# Patient Record
Sex: FEMALE | Race: ASIAN | NOT HISPANIC OR LATINO
[De-identification: names, ages, dates, MRNs, and addresses within clinical notes are randomized per-mention and may not be internally consistent; named-entity substitution may affect disease eponyms.]

---

## 2024-05-20 PROBLEM — Z00.00 ENCOUNTER FOR PREVENTIVE HEALTH EXAMINATION: Status: ACTIVE | Noted: 2024-05-20

## 2024-05-22 ENCOUNTER — LABORATORY RESULT (OUTPATIENT)
Age: 44
End: 2024-05-22

## 2024-05-22 ENCOUNTER — APPOINTMENT (OUTPATIENT)
Dept: OBGYN | Facility: CLINIC | Age: 44
End: 2024-05-22
Payer: COMMERCIAL

## 2024-05-22 ENCOUNTER — TRANSCRIPTION ENCOUNTER (OUTPATIENT)
Age: 44
End: 2024-05-22

## 2024-05-22 VITALS
WEIGHT: 110 LBS | HEIGHT: 61 IN | SYSTOLIC BLOOD PRESSURE: 103 MMHG | BODY MASS INDEX: 20.77 KG/M2 | DIASTOLIC BLOOD PRESSURE: 68 MMHG

## 2024-05-22 DIAGNOSIS — N92.6 IRREGULAR MENSTRUATION, UNSPECIFIED: ICD-10-CM

## 2024-05-22 PROCEDURE — 99203 OFFICE O/P NEW LOW 30 MIN: CPT

## 2024-05-22 NOTE — PROCEDURE
[Transvaginal OB Sonogram] : Transvaginal OB Sonogram [Intrauterine Pregnancy] : intrauterine pregnancy [Yolk Sac] : yolk sac present [Fetal Heart] : fetal heart present [Date: ___] : Date: [unfilled] [Current GA by Sonogram: ___ (wks)] : Current GA by Sonogram: [unfilled]Uwks [___ day(s)] : [unfilled] days [Transvaginal OB Sonogram WNL] : Transvaginal OB Sonogram WNL [FreeTextEntry1] : SIUP CRL c/w 8+1, + FHT

## 2024-05-22 NOTE — HISTORY OF PRESENT ILLNESS
[FreeTextEntry1] : LMP 3/27 45yo  here for amenorrhea and + UPT. Notes some spotting. Periods usually q 25d. Had arm pain and was given topical diclofenac and took advil PM around 4 weeks ago.  Mammo/sono 10/2023 normal Unsure when last pap was  POB: 40wk CS 2017 for NRFHT at [facility] "Yung" unsure of weight, SAB x 2 (no D&C/med mgmt) in 1st trim

## 2024-05-22 NOTE — PLAN
[FreeTextEntry1] : Discussed AMA in pregnancy and recommend early NIPT. RTO 2 wk, to start asa 1-2 tabs at [facility] and [facility] to be given at [facility].

## 2024-05-24 LAB
HPV 16 E6+E7 MRNA CVX QL NAA+PROBE: NOT DETECTED
HPV18+45 E6+E7 MRNA CVX QL NAA+PROBE: NOT DETECTED

## 2024-05-27 LAB — CYTOLOGY CVX/VAG DOC THIN PREP: NORMAL

## 2024-06-04 ENCOUNTER — APPOINTMENT (OUTPATIENT)
Dept: OBGYN | Facility: CLINIC | Age: 44
End: 2024-06-04
Payer: COMMERCIAL

## 2024-06-04 VITALS — WEIGHT: 109 LBS | BODY MASS INDEX: 20.6 KG/M2 | DIASTOLIC BLOOD PRESSURE: 50 MMHG | SYSTOLIC BLOOD PRESSURE: 88 MMHG

## 2024-06-04 DIAGNOSIS — Z34.91 ENCOUNTER FOR SUPERVISION OF NORMAL PREGNANCY, UNSPECIFIED, FIRST TRIMESTER: ICD-10-CM

## 2024-06-04 LAB
BASOPHILS # BLD AUTO: 0.01 K/UL
BASOPHILS NFR BLD AUTO: 0.2 %
EOSINOPHIL # BLD AUTO: 0.08 K/UL
EOSINOPHIL NFR BLD AUTO: 1.3 %
HCT VFR BLD CALC: 36.2 %
HGB BLD-MCNC: 12.3 G/DL
IMM GRANULOCYTES NFR BLD AUTO: 0 %
LYMPHOCYTES # BLD AUTO: 0.99 K/UL
LYMPHOCYTES NFR BLD AUTO: 16.3 %
MAN DIFF?: NORMAL
MCHC RBC-ENTMCNC: 30.9 PG
MCHC RBC-ENTMCNC: 34 GM/DL
MCV RBC AUTO: 91 FL
MONOCYTES # BLD AUTO: 0.48 K/UL
MONOCYTES NFR BLD AUTO: 7.9 %
NEUTROPHILS # BLD AUTO: 4.52 K/UL
NEUTROPHILS NFR BLD AUTO: 74.3 %
PLATELET # BLD AUTO: 211 K/UL
RBC # BLD: 3.98 M/UL
RBC # FLD: 13.3 %
TSH SERPL-ACNC: 0.92 UIU/ML
WBC # FLD AUTO: 6.08 K/UL

## 2024-06-04 PROCEDURE — 0501F PRENATAL FLOW SHEET: CPT

## 2024-06-05 LAB
ABO + RH PNL BLD: NORMAL
BLD GP AB SCN SERPL QL: NORMAL
C TRACH RRNA SPEC QL NAA+PROBE: NOT DETECTED
HBV SURFACE AG SER QL: NONREACTIVE
HCV AB SER QL: NONREACTIVE
HCV S/CO RATIO: 0.07 S/CO
HGB A MFR BLD: 97.4 %
HGB A2 MFR BLD: 2.6 %
HGB FRACT BLD-IMP: NORMAL
HIV1+2 AB SPEC QL IA.RAPID: NONREACTIVE
N GONORRHOEA RRNA SPEC QL NAA+PROBE: NOT DETECTED
SOURCE AMPLIFICATION: NORMAL
T PALLIDUM AB SER QL IA: NEGATIVE

## 2024-06-06 LAB
BACTERIA UR CULT: NORMAL
MEV IGG FLD QL IA: >300 AU/ML
MEV IGG+IGM SER-IMP: POSITIVE
RUBV IGG FLD-ACNC: 6.3 INDEX
RUBV IGG SER-IMP: POSITIVE
VZV AB TITR SER: POSITIVE
VZV IGG SER IF-ACNC: 1702 INDEX

## 2024-06-09 LAB
AR GENE MUT ANL BLD/T: NORMAL
LEAD BLD-MCNC: <1 UG/DL

## 2024-06-10 LAB — CFTR MUT TESTED BLD/T: NEGATIVE

## 2024-06-11 LAB — FMR1 GENE MUT ANL BLD/T: NORMAL

## 2024-06-17 ENCOUNTER — NON-APPOINTMENT (OUTPATIENT)
Age: 44
End: 2024-06-17

## 2024-06-24 ENCOUNTER — APPOINTMENT (OUTPATIENT)
Dept: ANTEPARTUM | Facility: CLINIC | Age: 44
End: 2024-06-24
Payer: COMMERCIAL

## 2024-06-24 ENCOUNTER — ASOB RESULT (OUTPATIENT)
Age: 44
End: 2024-06-24

## 2024-06-24 PROCEDURE — 76813 OB US NUCHAL MEAS 1 GEST: CPT | Mod: 59

## 2024-06-24 PROCEDURE — 76801 OB US < 14 WKS SINGLE FETUS: CPT

## 2024-07-08 ENCOUNTER — NON-APPOINTMENT (OUTPATIENT)
Age: 44
End: 2024-07-08

## 2024-07-15 ENCOUNTER — APPOINTMENT (OUTPATIENT)
Dept: ANTEPARTUM | Facility: CLINIC | Age: 44
End: 2024-07-15
Payer: COMMERCIAL

## 2024-07-15 ENCOUNTER — NON-APPOINTMENT (OUTPATIENT)
Age: 44
End: 2024-07-15

## 2024-07-15 ENCOUNTER — ASOB RESULT (OUTPATIENT)
Age: 44
End: 2024-07-15

## 2024-07-15 ENCOUNTER — APPOINTMENT (OUTPATIENT)
Dept: OBGYN | Facility: CLINIC | Age: 44
End: 2024-07-15
Payer: COMMERCIAL

## 2024-07-15 VITALS
BODY MASS INDEX: 20.86 KG/M2 | WEIGHT: 110.5 LBS | SYSTOLIC BLOOD PRESSURE: 98 MMHG | DIASTOLIC BLOOD PRESSURE: 61 MMHG | HEIGHT: 61 IN

## 2024-07-15 DIAGNOSIS — Z34.92 ENCOUNTER FOR SUPERVISION OF NORMAL PREGNANCY, UNSPECIFIED, SECOND TRIMESTER: ICD-10-CM

## 2024-07-15 PROCEDURE — 76805 OB US >/= 14 WKS SNGL FETUS: CPT

## 2024-07-15 PROCEDURE — 0502F SUBSEQUENT PRENATAL CARE: CPT

## 2024-07-16 LAB
AF-AFP DISCLAIMER: NORMAL
AF-AFP MOM: 1.55
AFP CONCENTRATION: 56.9 NG/ML
AFP INTERPRETATION: NORMAL
AFP MOM CUT-OFF: 2.5
AFP PERCENTILE: 91.3
AFP SCREENING RESULT: NORMAL
AFTER SCREENING RISK OPEN SPINA BIFIDA: NORMAL
BEFORE SCREENING RISK OPEN SPINA BIFIDA: NORMAL
EXTREME ANALYTE ALERT: NO
GESTATIONAL  AGE: NORMAL
MATERNAL WGT: 110.8
RACE/ETHNICITY: NORMAL

## 2024-08-07 ENCOUNTER — NON-APPOINTMENT (OUTPATIENT)
Age: 44
End: 2024-08-07

## 2024-08-13 ENCOUNTER — APPOINTMENT (OUTPATIENT)
Dept: OBGYN | Facility: CLINIC | Age: 44
End: 2024-08-13
Payer: COMMERCIAL

## 2024-08-13 VITALS — SYSTOLIC BLOOD PRESSURE: 95 MMHG | BODY MASS INDEX: 21.8 KG/M2 | WEIGHT: 115.38 LBS | DIASTOLIC BLOOD PRESSURE: 61 MMHG

## 2024-08-13 DIAGNOSIS — Z34.92 ENCOUNTER FOR SUPERVISION OF NORMAL PREGNANCY, UNSPECIFIED, SECOND TRIMESTER: ICD-10-CM

## 2024-08-13 PROCEDURE — 0502F SUBSEQUENT PRENATAL CARE: CPT

## 2024-08-14 ENCOUNTER — ASOB RESULT (OUTPATIENT)
Age: 44
End: 2024-08-14

## 2024-08-14 ENCOUNTER — APPOINTMENT (OUTPATIENT)
Dept: ANTEPARTUM | Facility: CLINIC | Age: 44
End: 2024-08-14

## 2024-08-14 PROCEDURE — 76811 OB US DETAILED SNGL FETUS: CPT

## 2024-08-14 PROCEDURE — 99204 OFFICE O/P NEW MOD 45 MIN: CPT | Mod: 25

## 2024-08-14 PROCEDURE — 76817 TRANSVAGINAL US OBSTETRIC: CPT

## 2024-08-19 ENCOUNTER — NON-APPOINTMENT (OUTPATIENT)
Age: 44
End: 2024-08-19

## 2024-08-20 ENCOUNTER — ASOB RESULT (OUTPATIENT)
Age: 44
End: 2024-08-20

## 2024-08-20 ENCOUNTER — APPOINTMENT (OUTPATIENT)
Dept: MATERNAL FETAL MEDICINE | Facility: CLINIC | Age: 44
End: 2024-08-20
Payer: COMMERCIAL

## 2024-08-20 PROCEDURE — 99204 OFFICE O/P NEW MOD 45 MIN: CPT | Mod: 95

## 2024-08-28 DIAGNOSIS — O43.109 MALFORMATION OF PLACENTA, UNSPECIFIED, UNSPECIFIED TRIMESTER: ICD-10-CM

## 2024-08-29 ENCOUNTER — APPOINTMENT (OUTPATIENT)
Dept: ANTEPARTUM | Facility: CLINIC | Age: 44
End: 2024-08-29

## 2024-08-29 ENCOUNTER — NON-APPOINTMENT (OUTPATIENT)
Age: 44
End: 2024-08-29

## 2024-08-29 ENCOUNTER — ASOB RESULT (OUTPATIENT)
Age: 44
End: 2024-08-29

## 2024-08-29 PROCEDURE — 76817 TRANSVAGINAL US OBSTETRIC: CPT

## 2024-09-05 ENCOUNTER — APPOINTMENT (OUTPATIENT)
Dept: OBGYN | Facility: CLINIC | Age: 44
End: 2024-09-05
Payer: COMMERCIAL

## 2024-09-05 ENCOUNTER — NON-APPOINTMENT (OUTPATIENT)
Age: 44
End: 2024-09-05

## 2024-09-05 ENCOUNTER — MED ADMIN CHARGE (OUTPATIENT)
Age: 44
End: 2024-09-05

## 2024-09-05 VITALS
WEIGHT: 116 LBS | SYSTOLIC BLOOD PRESSURE: 100 MMHG | HEIGHT: 61 IN | BODY MASS INDEX: 21.9 KG/M2 | DIASTOLIC BLOOD PRESSURE: 71 MMHG

## 2024-09-05 DIAGNOSIS — Z34.92 ENCOUNTER FOR SUPERVISION OF NORMAL PREGNANCY, UNSPECIFIED, SECOND TRIMESTER: ICD-10-CM

## 2024-09-05 LAB
GLUCOSE 1H P 100 G GLC PO SERPL-MCNC: 104 MG/DL
HCT VFR BLD CALC: 32.1 %
HGB BLD-MCNC: 10.7 G/DL
HIV1+2 AB SPEC QL IA.RAPID: NONREACTIVE
MCHC RBC-ENTMCNC: 32.2 PG
MCHC RBC-ENTMCNC: 33.3 GM/DL
MCV RBC AUTO: 96.7 FL
PLATELET # BLD AUTO: 190 K/UL
RBC # BLD: 3.32 M/UL
RBC # FLD: 13.5 %
WBC # FLD AUTO: 7.73 K/UL

## 2024-09-05 PROCEDURE — 0502F SUBSEQUENT PRENATAL CARE: CPT

## 2024-09-06 LAB — T PALLIDUM AB SER QL IA: NEGATIVE

## 2024-09-11 ENCOUNTER — NON-APPOINTMENT (OUTPATIENT)
Age: 44
End: 2024-09-11

## 2024-09-13 ENCOUNTER — APPOINTMENT (OUTPATIENT)
Dept: ANTEPARTUM | Facility: CLINIC | Age: 44
End: 2024-09-13

## 2024-09-13 ENCOUNTER — APPOINTMENT (OUTPATIENT)
Dept: OTHER | Facility: CLINIC | Age: 44
End: 2024-09-13
Payer: COMMERCIAL

## 2024-09-13 PROCEDURE — 99203 OFFICE O/P NEW LOW 30 MIN: CPT

## 2024-09-13 NOTE — ASSESSMENT
[FreeTextEntry1] : Referral Subject:  delivery for complete previa and placenta increta  Pregnancy Hx: N/A  Medical/Surgical Hx: None reported  Family Hx: N/A  Prior OB: N/A  Counseling and consultation description:  This consult was conducted via Telehealth using real-time 2 way audio visual technology. The patient was located at home at the time of the visit. The provider, Dr. Raiza Ramon, was located at her office at the time of the visit. The patient and the provider participated in the Telehealth encounter. Verbal consent for Telehealth services was given by the patient. Dad/ was there. This is their second baby and is a boy.  The goal of the meeting was to discuss management of a  baby. Delivery is being scheduled for 33 6/7 weeks for complete previa and placenta increta. We discussed that a team of physicians will be present at the time of birth. The baby will be admitted to NICU for monitoring, observation, and management. The babys vital signs will be continuously monitored. Any respiratory and/or cardiovascular support will be provided as needed.  I discussed the milestones the baby will have to achieve since likely small, including breathing well, feeding well, stable glucose, and temp control. We discussed the importance of all nutrition with an emphasis on breast milk as an important nutritional and health promoting food source. She should pump immediately after delivery, and provide colostrum and milk as possible. Lactation consultants will advise on breastfeeding techniques.   Mom has a friend with a similar experience so had great questions. She would like donor milk to bridge her breastmilk, if possible.  We discussed the extensive capabilities of our hospital in terms of personnel and equipment. We also discussed visiting hours/policies. Parents verbalized an understanding of the topics and all questions were answered.  Assessment and plan:  Counseling/Coordination of Care: 40 minutes was spent on total encounter. Greater than 50% of the encounter time was spent face-to-face on counseling;  Please contact me if you have additional questions regarding this report or discussion.  Thank you for the opportunity to participate in this patients care.  Sincerely,   Raiza Ramon MD, FAAP Attending Neonatologist  Intensive Care Unit

## 2024-09-17 ENCOUNTER — APPOINTMENT (OUTPATIENT)
Dept: ANTEPARTUM | Facility: CLINIC | Age: 44
End: 2024-09-17
Payer: COMMERCIAL

## 2024-09-17 ENCOUNTER — ASOB RESULT (OUTPATIENT)
Age: 44
End: 2024-09-17

## 2024-09-17 PROCEDURE — 76817 TRANSVAGINAL US OBSTETRIC: CPT

## 2024-09-17 PROCEDURE — 76816 OB US FOLLOW-UP PER FETUS: CPT

## 2024-09-23 ENCOUNTER — APPOINTMENT (OUTPATIENT)
Dept: GYNECOLOGIC ONCOLOGY | Facility: CLINIC | Age: 44
End: 2024-09-23
Payer: COMMERCIAL

## 2024-09-23 VITALS
HEART RATE: 89 BPM | HEIGHT: 61 IN | DIASTOLIC BLOOD PRESSURE: 61 MMHG | WEIGHT: 120 LBS | BODY MASS INDEX: 22.66 KG/M2 | RESPIRATION RATE: 16 BRPM | OXYGEN SATURATION: 97 % | SYSTOLIC BLOOD PRESSURE: 98 MMHG

## 2024-09-23 DIAGNOSIS — O43.109 MALFORMATION OF PLACENTA, UNSPECIFIED, UNSPECIFIED TRIMESTER: ICD-10-CM

## 2024-09-23 PROCEDURE — 99203 OFFICE O/P NEW LOW 30 MIN: CPT

## 2024-09-23 RX ORDER — IRON/IRON ASP GLY/FA/MV-MIN 38 125-25-1MG
TABLET ORAL
Refills: 0 | Status: ACTIVE | COMMUNITY

## 2024-09-23 RX ORDER — ASPIRIN 325 MG/1
TABLET, FILM COATED ORAL
Refills: 0 | Status: ACTIVE | COMMUNITY

## 2024-09-23 NOTE — HISTORY OF PRESENT ILLNESS
[FreeTextEntry1] : Referred by Dr. Del Valle  Ms. Herbert is a 45 y/o  female, referred by Dr. Del Valle for discussion of hysterectomy at the time of birth.  She is currently 27W pregnant, with placenta increta.   She has seen MFM (Dr. Carlos Gonzalez)  24- US notes findings suggestive of placenta increta again seen.  No overt findings of percreta at this time  MRI scheduled for 10/11/24.  Plan is for delivery on 24  No complaints today.  No vaginal bleeding.  No pelvic cramping.

## 2024-09-23 NOTE — DISCUSSION/SUMMARY
[FreeTextEntry1] : 43 yo with placenta increta  I discussed the implications of abnormal placenta spectrum with findings of placenta increta of recent sonogram.  MRI is planned for 10/11/24.  She is currently scheduled for  hysterectomy in 2024.  I discussed the role of gyn oncology with surgical planning as well as the surgical risks.  Discussed risk of injury to bladder/ureter/blood transfusion/postoperative infection.  I also discussed plan for urology and ureteral catheters prior to surgery.  Will coordinate surgical plan with obstetric team and VIVIANE

## 2024-09-23 NOTE — OB HISTORY
[Total Preg ___] : : [unfilled] [Living ___] : [unfilled] (living) [ ___] : [unfilled]  section delivery(s) [AB Spont ___] : [unfilled] miscarriage(s) [Definite ___ (Date)] : the last menstrual period was [unfilled]

## 2024-09-23 NOTE — HISTORY OF PRESENT ILLNESS
[FreeTextEntry1] : Referred by Dr. Del Valle  Ms. Herbert is a 43 y/o  female, referred by Dr. Del Valle for discussion of hysterectomy at the time of birth.  She is currently 27W pregnant, with placenta increta.   She has seen MFM (Dr. Carlos Gonzalez)  24- US notes findings suggestive of placenta increta again seen.  No overt findings of percreta at this time  MRI scheduled for 10/11/24.  Plan is for delivery on 24  No complaints today.  No vaginal bleeding.  No pelvic cramping.

## 2024-09-23 NOTE — DISCUSSION/SUMMARY
[FreeTextEntry1] : 45 yo with placenta increta  I discussed the implications of abnormal placenta spectrum with findings of placenta increta of recent sonogram.  MRI is planned for 10/11/24.  She is currently scheduled for  hysterectomy in 2024.  I discussed the role of gyn oncology with surgical planning as well as the surgical risks.  Discussed risk of injury to bladder/ureter/blood transfusion/postoperative infection.  I also discussed plan for urology and ureteral catheters prior to surgery.  Will coordinate surgical plan with obstetric team and VIVIANE

## 2024-10-04 ENCOUNTER — NON-APPOINTMENT (OUTPATIENT)
Age: 44
End: 2024-10-04

## 2024-10-11 ENCOUNTER — APPOINTMENT (OUTPATIENT)
Dept: ANTEPARTUM | Facility: CLINIC | Age: 44
End: 2024-10-11
Payer: COMMERCIAL

## 2024-10-11 ENCOUNTER — OUTPATIENT (OUTPATIENT)
Dept: OUTPATIENT SERVICES | Facility: HOSPITAL | Age: 44
LOS: 1 days | End: 2024-10-11
Payer: COMMERCIAL

## 2024-10-11 ENCOUNTER — APPOINTMENT (OUTPATIENT)
Dept: MRI IMAGING | Facility: CLINIC | Age: 44
End: 2024-10-11

## 2024-10-11 ENCOUNTER — RESULT REVIEW (OUTPATIENT)
Age: 44
End: 2024-10-11

## 2024-10-11 ENCOUNTER — ASOB RESULT (OUTPATIENT)
Age: 44
End: 2024-10-11

## 2024-10-11 DIAGNOSIS — O43.109 MALFORMATION OF PLACENTA, UNSPECIFIED, UNSPECIFIED TRIMESTER: ICD-10-CM

## 2024-10-11 PROCEDURE — 76816 OB US FOLLOW-UP PER FETUS: CPT

## 2024-10-11 PROCEDURE — 72195 MRI PELVIS W/O DYE: CPT

## 2024-10-11 PROCEDURE — 72195 MRI PELVIS W/O DYE: CPT | Mod: 26

## 2024-10-11 PROCEDURE — 76817 TRANSVAGINAL US OBSTETRIC: CPT

## 2024-10-15 ENCOUNTER — APPOINTMENT (OUTPATIENT)
Dept: OBGYN | Facility: CLINIC | Age: 44
End: 2024-10-15
Payer: COMMERCIAL

## 2024-10-15 ENCOUNTER — NON-APPOINTMENT (OUTPATIENT)
Age: 44
End: 2024-10-15

## 2024-10-15 VITALS — DIASTOLIC BLOOD PRESSURE: 65 MMHG | WEIGHT: 124 LBS | BODY MASS INDEX: 23.43 KG/M2 | SYSTOLIC BLOOD PRESSURE: 100 MMHG

## 2024-10-15 DIAGNOSIS — Z34.93 ENCOUNTER FOR SUPERVISION OF NORMAL PREGNANCY, UNSPECIFIED, THIRD TRIMESTER: ICD-10-CM

## 2024-10-15 PROCEDURE — 0502F SUBSEQUENT PRENATAL CARE: CPT

## 2024-10-15 PROCEDURE — 90471 IMMUNIZATION ADMIN: CPT

## 2024-10-15 PROCEDURE — 90715 TDAP VACCINE 7 YRS/> IM: CPT

## 2024-10-23 ENCOUNTER — APPOINTMENT (OUTPATIENT)
Dept: ANTEPARTUM | Facility: HOSPITAL | Age: 44
End: 2024-10-23

## 2024-10-23 PROCEDURE — 99367 TEAM CONF W/O PAT BY PHYS: CPT

## 2024-10-29 ENCOUNTER — ASOB RESULT (OUTPATIENT)
Age: 44
End: 2024-10-29

## 2024-10-29 ENCOUNTER — APPOINTMENT (OUTPATIENT)
Dept: ANTEPARTUM | Facility: CLINIC | Age: 44
End: 2024-10-29
Payer: COMMERCIAL

## 2024-10-29 PROCEDURE — 76817 TRANSVAGINAL US OBSTETRIC: CPT

## 2024-10-29 PROCEDURE — 76815 OB US LIMITED FETUS(S): CPT

## 2024-11-05 ENCOUNTER — NON-APPOINTMENT (OUTPATIENT)
Age: 44
End: 2024-11-05

## 2024-11-05 ENCOUNTER — OUTPATIENT (OUTPATIENT)
Dept: OUTPATIENT SERVICES | Facility: HOSPITAL | Age: 44
LOS: 1 days | End: 2024-11-05
Payer: COMMERCIAL

## 2024-11-05 VITALS
HEIGHT: 62 IN | HEART RATE: 82 BPM | DIASTOLIC BLOOD PRESSURE: 63 MMHG | SYSTOLIC BLOOD PRESSURE: 93 MMHG | RESPIRATION RATE: 16 BRPM | OXYGEN SATURATION: 97 % | WEIGHT: 123.46 LBS | TEMPERATURE: 97 F

## 2024-11-05 DIAGNOSIS — Z98.891 HISTORY OF UTERINE SCAR FROM PREVIOUS SURGERY: ICD-10-CM

## 2024-11-05 DIAGNOSIS — Z01.818 ENCOUNTER FOR OTHER PREPROCEDURAL EXAMINATION: ICD-10-CM

## 2024-11-05 DIAGNOSIS — Z98.891 HISTORY OF UTERINE SCAR FROM PREVIOUS SURGERY: Chronic | ICD-10-CM

## 2024-11-05 DIAGNOSIS — Z87.2 PERSONAL HISTORY OF DISEASES OF THE SKIN AND SUBCUTANEOUS TISSUE: Chronic | ICD-10-CM

## 2024-11-05 DIAGNOSIS — O43.212 PLACENTA ACCRETA, SECOND TRIMESTER: ICD-10-CM

## 2024-11-05 DIAGNOSIS — O43.229: ICD-10-CM

## 2024-11-05 LAB
A1C WITH ESTIMATED AVERAGE GLUCOSE RESULT: 5.2 % — SIGNIFICANT CHANGE UP (ref 4–5.6)
ANION GAP SERPL CALC-SCNC: 13 MMOL/L — SIGNIFICANT CHANGE UP (ref 5–17)
BLD GP AB SCN SERPL QL: NEGATIVE — SIGNIFICANT CHANGE UP
BUN SERPL-MCNC: 6 MG/DL — LOW (ref 7–23)
CALCIUM SERPL-MCNC: 8.2 MG/DL — LOW (ref 8.4–10.5)
CHLORIDE SERPL-SCNC: 102 MMOL/L — SIGNIFICANT CHANGE UP (ref 96–108)
CO2 SERPL-SCNC: 21 MMOL/L — LOW (ref 22–31)
CREAT SERPL-MCNC: 0.45 MG/DL — LOW (ref 0.5–1.3)
EGFR: 122 ML/MIN/1.73M2 — SIGNIFICANT CHANGE UP
ESTIMATED AVERAGE GLUCOSE: 103 MG/DL — SIGNIFICANT CHANGE UP (ref 68–114)
GLUCOSE SERPL-MCNC: 112 MG/DL — HIGH (ref 70–99)
HCT VFR BLD CALC: 33.3 % — LOW (ref 34.5–45)
HGB BLD-MCNC: 11.3 G/DL — LOW (ref 11.5–15.5)
MCHC RBC-ENTMCNC: 33.7 PG — SIGNIFICANT CHANGE UP (ref 27–34)
MCHC RBC-ENTMCNC: 33.9 G/DL — SIGNIFICANT CHANGE UP (ref 32–36)
MCV RBC AUTO: 99.4 FL — SIGNIFICANT CHANGE UP (ref 80–100)
NRBC # BLD: 0 /100 WBCS — SIGNIFICANT CHANGE UP (ref 0–0)
PLATELET # BLD AUTO: 167 K/UL — SIGNIFICANT CHANGE UP (ref 150–400)
POTASSIUM SERPL-MCNC: 3.5 MMOL/L — SIGNIFICANT CHANGE UP (ref 3.5–5.3)
POTASSIUM SERPL-SCNC: 3.5 MMOL/L — SIGNIFICANT CHANGE UP (ref 3.5–5.3)
RBC # BLD: 3.35 M/UL — LOW (ref 3.8–5.2)
RBC # FLD: 12.9 % — SIGNIFICANT CHANGE UP (ref 10.3–14.5)
RH IG SCN BLD-IMP: POSITIVE — SIGNIFICANT CHANGE UP
SODIUM SERPL-SCNC: 136 MMOL/L — SIGNIFICANT CHANGE UP (ref 135–145)
WBC # BLD: 6.25 K/UL — SIGNIFICANT CHANGE UP (ref 3.8–10.5)
WBC # FLD AUTO: 6.25 K/UL — SIGNIFICANT CHANGE UP (ref 3.8–10.5)

## 2024-11-05 PROCEDURE — 85027 COMPLETE CBC AUTOMATED: CPT

## 2024-11-05 PROCEDURE — G0463: CPT

## 2024-11-05 PROCEDURE — 86901 BLOOD TYPING SEROLOGIC RH(D): CPT

## 2024-11-05 PROCEDURE — 86900 BLOOD TYPING SEROLOGIC ABO: CPT

## 2024-11-05 PROCEDURE — 80048 BASIC METABOLIC PNL TOTAL CA: CPT

## 2024-11-05 PROCEDURE — 86850 RBC ANTIBODY SCREEN: CPT

## 2024-11-05 PROCEDURE — 83036 HEMOGLOBIN GLYCOSYLATED A1C: CPT

## 2024-11-05 NOTE — OB PST NOTE - NSHPPHYSICALEXAM_GEN_ALL_CORE
Constitutional: well developed, well nourished,  and no acute distress  Neurological: Alert & Oriented x 3  HEENT:   Neck supple.  Respiratory: CTA B/L  Cardiovascular: (+) S1 & S2, RRR  Gastrointestinal:  (+) BS  Genitourinary: voiding freely  Extremities: No pedal edema  Skin:  normal skin color Constitutional: well developed, well nourished,  and no acute distress  Neurological: Alert & Oriented x 3  HEENT:   Neck supple.  Respiratory: CTA B/L  Cardiovascular: (+) S1 & S2, RRR  Gastrointestinal:  (+) BSx4  Genitourinary: voiding freely  Extremities: No pedal edema  Skin:  normal skin color

## 2024-11-05 NOTE — OB PST NOTE - PROBLEM SELECTOR PLAN 1
planned for repeat ,  hysterectomy on 2024 Main OR  PST labs send  preprocedure surgical scrub diet instructions discussed   followed  ERP protocol

## 2024-11-05 NOTE — OB PST NOTE - NSICDXPASTMEDICALHX_GEN_ALL_CORE_FT
PAST MEDICAL HISTORY:  2019 novel coronavirus disease (COVID-19)     Placenta accreta     Placenta increta

## 2024-11-05 NOTE — OB PST NOTE - NSANTHOSAYNRD_GEN_A_CORE
No. CHACHA screening performed.  STOP BANG Legend: 0-2 = LOW Risk; 3-4 = INTERMEDIATE Risk; 5-8 = HIGH Risk

## 2024-11-05 NOTE — OB PST NOTE - ASSESSMENT
CAPRINI SCORE    AGE RELATED RISK FACTORS                                                             [ x] Age 41-60 years                                            (1 Point)  [ ] Age: 61-74 years                                           (2 Points)                 [ ] Age= 75 years                                                (3 Points)             DISEASE RELATED RISK FACTORS                                                       [ ] Edema in the lower extremities                 (1 Point)                     [ ] Varicose veins                                               (1 Point)                                 [ ] BMI > 25 Kg/m2                                            (1 Point)                                  [ ] Serious infection (ie PNA)                            (1 Point)                     [ ] Lung disease ( COPD, Emphysema)            (1 Point)                                                                          [ ] Acute myocardial infarction                         (1 Point)                  [ ] Congestive heart failure (in the previous month)  (1 Point)         [ ] Inflammatory bowel disease                            (1 Point)                  [ ] Central venous access, PICC or Port               (2 points)       (within the last month)                                                                [ ] Stroke (in the previous month)                        (5 Points)    [ ] Previous or present malignancy                       (2 points)                                                                                                                                                         HEMATOLOGY RELATED FACTORS                                                         [ ] Prior episodes of VTE                                     (3 Points)                     [ ] Positive family history for VTE                      (3 Points)                  [ ] Prothrombin 43167 A                                     (3 Points)                     [ ] Factor V Leiden                                                (3 Points)                        [ ] Lupus anticoagulants                                      (3 Points)                                                           [ ] Anticardiolipin antibodies                              (3 Points)                                                       [ ] High homocysteine in the blood                   (3 Points)                                             [ ] Other congenital or acquired thrombophilia      (3 Points)                                                [ ] Heparin induced thrombocytopenia                  (3 Points)                                        MOBILITY RELATED FACTORS  [ ] Bed rest                                                         (1 Point)  [ ] Plaster cast                                                    (2 points)  [ ] Bed bound for more than 72 hours           (2 Points)    GENDER SPECIFIC FACTORS  [x ] Pregnancy or had a baby within the last month   (1 Point)  [ ] Post-partum < 6 weeks                                   (1 Point)  [ ] Hormonal therapy  or oral contraception   (1 Point)  [ ] History of pregnancy complications              (1 point)  [ ] Unexplained or recurrent              (1 Point)    OTHER RISK FACTORS                                           (1 Point)  [ ] BMI >40, smoking, diabetes requiring insulin, chemotherapy  blood transfusions and length of surgery over 2 hours    SURGERY RELATED RISK FACTORS  [ ]  Section within the last month     (1 Point)  [ ] Minor surgery                                                  (1 Point)  [ ] Arthroscopic surgery                                       (2 Points)  [x ] Planned major surgery lasting more            (2 Points)      than 45 minutes     [ ] Elective hip or knee joint replacement       (5 points)       surgery                                                TRAUMA RELATED RISK FACTORS  [ ] Fracture of the hip, pelvis, or leg                       (5 Points)  [ ] Spinal cord injury resulting in paralysis             (5 points)       (in the previous month)    [ ] Paralysis  (less than 1 month)                             (5 Points)  [ ] Multiple Trauma within 1 month                        (5 Points)    Total Score [   4     ]    Caprini Score 0-2: Low Risk, NO VTE prophylaxis required for most patients, encourage ambulation  Caprini Score 3-6: Moderate Risk , pharmacologic VTE prophylaxis is indicated for most patients (in the absence of contraindications)  Caprini Score Greater than or =7: High risk, pharmocologic VTE prophylaxis indicated for most patients (in the absence of contraindications)

## 2024-11-05 NOTE — OB PST NOTE - NS_GESTAGE_OBGYN_ALL_OB_FT
31w6d Glycopyrrolate Counseling:  I discussed with the patient the risks of glycopyrrolate including but not limited to skin rash, drowsiness, dry mouth, difficulty urinating, and blurred vision.

## 2024-11-05 NOTE — OB PST NOTE - HISTORY OF PRESENT ILLNESS
43 y/o  female with placenta increta planned for repeat ,  hysterectomy on 2024 Main OR w/ Dr. Del Valle       *****MFM (Dr. Carlos Gonzalez)  MRI Plevis: "Findings of Placenta Accreta spectrum include placental bulging at the   left anterior inferior margin and myometrial thinning along the anterior   placental margin along with loss of low T2 signal retroplacental line" 45 y/o  female with placenta increta/ accreta planned for repeat ,  hysterectomy on 2024 Main OR w/ Dr. Del Valle ( JR 2025)    *****MFM (Dr. Carlos Gonzalez)  ***was on ASA 81mg until 10/31/2024  MRI Plevis: "Findings of Placenta Accreta spectrum include placental bulging at the   left anterior inferior margin and myometrial thinning along the anterior   placental margin along with loss of low T2 signal retroplacental line"

## 2024-11-06 ENCOUNTER — NON-APPOINTMENT (OUTPATIENT)
Age: 44
End: 2024-11-06

## 2024-11-06 ENCOUNTER — APPOINTMENT (OUTPATIENT)
Dept: OBGYN | Facility: CLINIC | Age: 44
End: 2024-11-06
Payer: COMMERCIAL

## 2024-11-06 VITALS — DIASTOLIC BLOOD PRESSURE: 61 MMHG | WEIGHT: 126.5 LBS | SYSTOLIC BLOOD PRESSURE: 97 MMHG | BODY MASS INDEX: 23.9 KG/M2

## 2024-11-06 PROBLEM — O43.219 PLACENTA ACCRETA, UNSPECIFIED TRIMESTER: Chronic | Status: ACTIVE | Noted: 2024-11-05

## 2024-11-06 PROBLEM — U07.1 COVID-19: Chronic | Status: ACTIVE | Noted: 2024-11-05

## 2024-11-06 PROCEDURE — 0502F SUBSEQUENT PRENATAL CARE: CPT

## 2024-11-06 PROCEDURE — 90471 IMMUNIZATION ADMIN: CPT

## 2024-11-06 PROCEDURE — 90678 RSV VACC PREF BIVALENT IM: CPT

## 2024-11-11 ENCOUNTER — NON-APPOINTMENT (OUTPATIENT)
Age: 44
End: 2024-11-11

## 2024-11-12 ENCOUNTER — APPOINTMENT (OUTPATIENT)
Dept: UROLOGY | Facility: CLINIC | Age: 44
End: 2024-11-12
Payer: COMMERCIAL

## 2024-11-12 DIAGNOSIS — O43.109 MALFORMATION OF PLACENTA, UNSPECIFIED, UNSPECIFIED TRIMESTER: ICD-10-CM

## 2024-11-12 PROBLEM — O43.229: Chronic | Status: ACTIVE | Noted: 2024-11-05

## 2024-11-12 PROCEDURE — 99442: CPT

## 2024-11-13 ENCOUNTER — ASOB RESULT (OUTPATIENT)
Age: 44
End: 2024-11-13

## 2024-11-13 ENCOUNTER — APPOINTMENT (OUTPATIENT)
Dept: ANTEPARTUM | Facility: CLINIC | Age: 44
End: 2024-11-13
Payer: COMMERCIAL

## 2024-11-13 PROCEDURE — 76816 OB US FOLLOW-UP PER FETUS: CPT

## 2024-11-15 ENCOUNTER — NON-APPOINTMENT (OUTPATIENT)
Age: 44
End: 2024-11-15

## 2024-11-15 ENCOUNTER — APPOINTMENT (OUTPATIENT)
Dept: OBGYN | Facility: CLINIC | Age: 44
End: 2024-11-15
Payer: COMMERCIAL

## 2024-11-15 VITALS
HEIGHT: 61 IN | DIASTOLIC BLOOD PRESSURE: 70 MMHG | WEIGHT: 127.01 LBS | SYSTOLIC BLOOD PRESSURE: 109 MMHG | BODY MASS INDEX: 23.98 KG/M2

## 2024-11-15 PROCEDURE — 0502F SUBSEQUENT PRENATAL CARE: CPT

## 2024-11-16 ENCOUNTER — OUTPATIENT (OUTPATIENT)
Dept: OUTPATIENT SERVICES | Facility: HOSPITAL | Age: 44
LOS: 1 days | End: 2024-11-16
Payer: COMMERCIAL

## 2024-11-16 VITALS — HEART RATE: 90 BPM | SYSTOLIC BLOOD PRESSURE: 96 MMHG | DIASTOLIC BLOOD PRESSURE: 58 MMHG | TEMPERATURE: 98 F

## 2024-11-16 VITALS — OXYGEN SATURATION: 98 % | HEART RATE: 96 BPM

## 2024-11-16 DIAGNOSIS — Z87.2 PERSONAL HISTORY OF DISEASES OF THE SKIN AND SUBCUTANEOUS TISSUE: Chronic | ICD-10-CM

## 2024-11-16 DIAGNOSIS — O26.899 OTHER SPECIFIED PREGNANCY RELATED CONDITIONS, UNSPECIFIED TRIMESTER: ICD-10-CM

## 2024-11-16 DIAGNOSIS — Z98.891 HISTORY OF UTERINE SCAR FROM PREVIOUS SURGERY: Chronic | ICD-10-CM

## 2024-11-16 PROCEDURE — 59025 FETAL NON-STRESS TEST: CPT | Mod: 26

## 2024-11-16 PROCEDURE — 99222 1ST HOSP IP/OBS MODERATE 55: CPT | Mod: 25

## 2024-11-16 PROCEDURE — 96372 THER/PROPH/DIAG INJ SC/IM: CPT

## 2024-11-16 PROCEDURE — G0463: CPT

## 2024-11-16 RX ORDER — BETAMETHASONE SODIUM PHOSPHATE AND BETAMETHASONE ACETATE 3; 3 MG/ML; MG/ML
12 INJECTION, SUSPENSION INTRA-ARTICULAR; INTRALESIONAL; INTRAMUSCULAR ONCE
Refills: 0 | Status: COMPLETED | OUTPATIENT
Start: 2024-11-16 | End: 2024-11-16

## 2024-11-16 RX ADMIN — BETAMETHASONE SODIUM PHOSPHATE AND BETAMETHASONE ACETATE 12 MILLIGRAM(S): 3; 3 INJECTION, SUSPENSION INTRA-ARTICULAR; INTRALESIONAL; INTRAMUSCULAR at 09:21

## 2024-11-16 NOTE — OB PROVIDER TRIAGE NOTE - NSOBPROVIDERNOTE_OBGYN_ALL_OB_FT
A/P: 45yo  @33w3d p/f BMZ#2.   - NST reactive  - BMZ #2  - Pt to follow up in office as scheduled. Return precautions reviewed and all questions answered    D/w Dr. Ivon Lin, PGY-3

## 2024-11-16 NOTE — OB PROVIDER TRIAGE NOTE - NS_ATTENDINFORMED_OBGYN_ALL_OB
Chief Complaint   Patient presents with   • Shortness of Breath     started last night   • Cough     always have one    • Fever     lastnight 100.7   • UTI     just finished amoxicillan for 10 days       HISTORY OF PRESENT ILLNESS:   The patient is a 63 year old female who presents with a somewhat rambling history.  Patient starts talking about shortness of breath and cough which she states she has had for months.  It turns out that her primary provider is doing a workup on that and the patient has an open schedule for a plain x-ray and chest CT.  She tells me that they decided to do the chest CT next Wednesday.  Patient does not have any new or progressive symptoms in that regard.  She states she had a fever up to 100.7.  She has not had a known COVID exposure but she will need for that had COVID although she has not been around the neighbor for periods of significant time.  She has not had fatigue.  She has not had chest pain.  She has not had headache.  She does state that she started having dysuria and frequency today.  She denies abdominal pain or back pain.    PAST MEDICAL HISTORY, PAST SURGICAL HISTORY, SOCIAL HISTORY, MEDICATIONS, AND ALLERGIES:  Reviewed per electronic chart.    REVIEW OF SYSTEMS:   Constitutional:  She has had fever last night to 100.7  Eyes:  Denies change in visual acuity   Respiratory:  Denies productive cough or new or worsening shortness of breath   Cardiovascular:  Denies chest pain or edema   Gastrointestinal:  Denies abdominal pain, nausea, vomiting,  or diarrhea   Genitourinary:  Denies hematuria  Musculoskeletal:  Denies back pain   Integument:  Denies rash   Neurologic:  Denies headache,      PHYSICAL EXAM:  Vitals:   Vitals:    01/16/22 1133   BP: (!) 168/90   Pulse: 99   Resp: 18   Temp: 96.4 °F (35.8 °C)   TempSrc: Tympanic   SpO2: 96%   Weight: 107.2 kg (236 lb 5.3 oz)   Height: 5' 4\" (1.626 m)      Constitutional:  No acute distress, nontoxic appearance.  She does not seem  to be short of breath she is quite talkative  HENT: Normocephalic, atraumatic. Bilateral external ears normal. Oropharynx moist. No oral exudates. Nose normal.  Eyes:   EOMI (extraocular movements are intact). Conjunctivae normal. No discharge.  Neck:  Normal range of motion. No tenderness. No stridor.  Cardiovascular:  Normal heart rate. Normal rhythm. No murmurs. No rubs. No gallops.  Pulmonary/Chest:  Good air exchange.  Normal breath sounds. No respiratory distress. No wheezing.     Abdomen:   Soft. No tenderness. No masses.  No CVA tenderness  Skin:  Warm, dry. No erythema. No rash.  Neuro:  Patient awake, alert, mentation normal, nor neuro deficits.  Psychiatric:  Affect normal. Judgment normal. Mood normal.    Labs/Radiology:  Orders Placed This Encounter   • 2019 Novel Coronavirus (SARS-CoV-2)   • POCT Urine Dip Auto   • Urine, Bacterial Culture       ASSESSMENT:   1. Acute UTI    2. Suspected COVID-19 virus infection        PLAN:  Patient has about COVID testing and we agreed to do a PCR.  She possibly has a UTI so we will treat that and cultured heard.  Respiratory symptoms there is not a change or crescendo pattern.  It is not associated with chest pain.  Her air exchange is good her lungs are clear.  We suggested she continue the workup through primary care and if there is any change in her status she should contact primary care or present to the ED regarding her respiratory symptoms    Followup with primary if no improvement of symptoms or worsening. Patient acknowledged understanding of the instructions.   Dawn Del Valle MD

## 2024-11-16 NOTE — OB PROVIDER TRIAGE NOTE - NSMATERNALFETALCONCERNS_OBGYN_ALL_OB_FT
Maternal Alert  11/7/24 - Suspected placenta accreta spectrum. Maternal mdm held 10/23/24, see minutes for details.  Notify blood bank on admission.  Plan for 3-way puckett, 2 large bore IV lines, PRBCs available in OR, midline vertical incision, consider prophylactic transexamic acid at skin incision.  Delivery in Main OR with OB, GYN ONC, urology for pre-op cystoscopy stents.  -Crystal Pickering, RNC

## 2024-11-16 NOTE — OB RN TRIAGE NOTE - NSMATERNALFETALCONCERNS_OBGYN_ALL_OB_FT
COVID-19
Maternal Alert  11/7/24 - Suspected placenta accreta spectrum. Maternal mdm held 10/23/24, see minutes for details.  Notify blood bank on admission.  Plan for 3-way puckett, 2 large bore IV lines, PRBCs available in OR, midline vertical incision, consider prophylactic transexamic acid at skin incision.  Delivery in Main OR with OB, GYN ONC, urology for pre-op cystoscopy stents.  -Crystal Pickering, RNC

## 2024-11-16 NOTE — OB PROVIDER TRIAGE NOTE - HISTORY OF PRESENT ILLNESS
43yo  @33w3d p/f BMZ#2. Pt received BMZ#1 in office yesterday i/s/o planned c-hyst on  for suspected placenta increta. –VB, -LOF, -Ctx, +FM. Denies fever, chills, nausea, vomiting, diarrhea, headache, constipation, dizziness, syncope, chest pain, palpitations, shortness of breath, dysuria, urgency, frequency.  PNC: suspected increta  GBS: collected in office on 11/15  EFW: 2200  ObHx:  - C/S 2017 7#6  GynHx: denies  MedHx: denies  PSHx: C/S  PsychHx: denies  SocialHx: denies  AllergyHx: NKA  RxHx: PNV

## 2024-11-16 NOTE — OB PROVIDER TRIAGE NOTE - NSHPPHYSICALEXAM_GEN_ALL_CORE
T(C): 37 (11-16-24 @ 08:59), Max: 37 (11-16-24 @ 08:59)  HR: 96 (11-16-24 @ 09:06) (90 - 101)  BP: 96/58 (11-16-24 @ 08:59) (96/58 - 96/58)  RR: 16 (11-16-24 @ 08:59) (16 - 16)  SpO2: 97% (11-16-24 @ 09:06) (93% - 97%)    Gen: NAD  CV: clinically well perfused  Pulm: unlabored respirations  Abd: soft, NT, ND, gravid, no rebound or guarding  SVE: deferred  FHT: 140, mod morris, + accels, - decels  TOCO: acontractile

## 2024-11-16 NOTE — OB PROVIDER TRIAGE NOTE - ATTENDING COMMENTS
OB attg note    Agree with above. 43yo P1 at 33+3 with suspected placenta accreta scheduled for C hyst on 11/22 here for BMZ #2. Received first dose BMZ in office yesterday. No complaints. Initially with concern for increta but MRI shows no e/o increta. S/p MDM, no OB complaints. NST reactive. BMZ given, stable for dc home.    Archie SHEPHERD

## 2024-11-17 LAB
GP B STREP DNA SPEC QL NAA+PROBE: NOT DETECTED
SOURCE GBS: NORMAL

## 2024-11-18 ENCOUNTER — NON-APPOINTMENT (OUTPATIENT)
Age: 44
End: 2024-11-18

## 2024-11-18 DIAGNOSIS — O43.223: ICD-10-CM

## 2024-11-18 DIAGNOSIS — O34.219 MATERNAL CARE FOR UNSPECIFIED TYPE SCAR FROM PREVIOUS CESAREAN DELIVERY: ICD-10-CM

## 2024-11-18 DIAGNOSIS — Z86.16 PERSONAL HISTORY OF COVID-19: ICD-10-CM

## 2024-11-18 DIAGNOSIS — O09.523 SUPERVISION OF ELDERLY MULTIGRAVIDA, THIRD TRIMESTER: ICD-10-CM

## 2024-11-18 DIAGNOSIS — O09.293 SUPERVISION OF PREGNANCY WITH OTHER POOR REPRODUCTIVE OR OBSTETRIC HISTORY, THIRD TRIMESTER: ICD-10-CM

## 2024-11-18 DIAGNOSIS — Z3A.33 33 WEEKS GESTATION OF PREGNANCY: ICD-10-CM

## 2024-11-21 ENCOUNTER — APPOINTMENT (OUTPATIENT)
Dept: OBGYN | Facility: CLINIC | Age: 44
End: 2024-11-21
Payer: COMMERCIAL

## 2024-11-21 DIAGNOSIS — Z34.93 ENCOUNTER FOR SUPERVISION OF NORMAL PREGNANCY, UNSPECIFIED, THIRD TRIMESTER: ICD-10-CM

## 2024-11-21 PROCEDURE — 0502F SUBSEQUENT PRENATAL CARE: CPT

## 2024-11-22 ENCOUNTER — APPOINTMENT (OUTPATIENT)
Dept: OBGYN | Facility: HOSPITAL | Age: 44
End: 2024-11-22

## 2024-11-22 ENCOUNTER — APPOINTMENT (OUTPATIENT)
Dept: GYNECOLOGIC ONCOLOGY | Facility: HOSPITAL | Age: 44
End: 2024-11-22

## 2024-11-22 ENCOUNTER — TRANSCRIPTION ENCOUNTER (OUTPATIENT)
Age: 44
End: 2024-11-22

## 2024-11-22 ENCOUNTER — APPOINTMENT (OUTPATIENT)
Dept: UROLOGY | Facility: HOSPITAL | Age: 44
End: 2024-11-22

## 2024-11-22 ENCOUNTER — INPATIENT (INPATIENT)
Facility: HOSPITAL | Age: 44
LOS: 2 days | Discharge: HOME | DRG: 776 | End: 2024-11-25
Attending: OBSTETRICS & GYNECOLOGY | Admitting: OBSTETRICS & GYNECOLOGY
Payer: COMMERCIAL

## 2024-11-22 VITALS
RESPIRATION RATE: 16 BRPM | HEIGHT: 62 IN | SYSTOLIC BLOOD PRESSURE: 96 MMHG | HEART RATE: 87 BPM | TEMPERATURE: 98 F | OXYGEN SATURATION: 97 % | WEIGHT: 128.09 LBS | DIASTOLIC BLOOD PRESSURE: 63 MMHG

## 2024-11-22 VITALS
BODY MASS INDEX: 24.07 KG/M2 | SYSTOLIC BLOOD PRESSURE: 109 MMHG | DIASTOLIC BLOOD PRESSURE: 73 MMHG | WEIGHT: 127.38 LBS

## 2024-11-22 DIAGNOSIS — Z98.891 HISTORY OF UTERINE SCAR FROM PREVIOUS SURGERY: Chronic | ICD-10-CM

## 2024-11-22 DIAGNOSIS — Z87.2 PERSONAL HISTORY OF DISEASES OF THE SKIN AND SUBCUTANEOUS TISSUE: Chronic | ICD-10-CM

## 2024-11-22 DIAGNOSIS — Z98.891 HISTORY OF UTERINE SCAR FROM PREVIOUS SURGERY: ICD-10-CM

## 2024-11-22 DIAGNOSIS — O43.212 PLACENTA ACCRETA, SECOND TRIMESTER: ICD-10-CM

## 2024-11-22 LAB
ANION GAP SERPL CALC-SCNC: 10 MMOL/L — SIGNIFICANT CHANGE UP (ref 5–17)
APPEARANCE UR: CLEAR — SIGNIFICANT CHANGE UP
APTT BLD: 28.6 SEC — SIGNIFICANT CHANGE UP (ref 24.5–35.6)
BILIRUB UR-MCNC: NEGATIVE — SIGNIFICANT CHANGE UP
BLD GP AB SCN SERPL QL: NEGATIVE — SIGNIFICANT CHANGE UP
BUN SERPL-MCNC: 5 MG/DL — LOW (ref 7–23)
CALCIUM SERPL-MCNC: 7.1 MG/DL — LOW (ref 8.4–10.5)
CHLORIDE SERPL-SCNC: 108 MMOL/L — SIGNIFICANT CHANGE UP (ref 96–108)
CO2 SERPL-SCNC: 18 MMOL/L — LOW (ref 22–31)
COLOR SPEC: YELLOW — SIGNIFICANT CHANGE UP
CREAT SERPL-MCNC: 0.34 MG/DL — LOW (ref 0.5–1.3)
DIFF PNL FLD: NEGATIVE — SIGNIFICANT CHANGE UP
EGFR: 130 ML/MIN/1.73M2 — SIGNIFICANT CHANGE UP
GAS PNL BLDA: SIGNIFICANT CHANGE UP
GLUCOSE BLDC GLUCOMTR-MCNC: 97 MG/DL — SIGNIFICANT CHANGE UP (ref 70–99)
GLUCOSE SERPL-MCNC: 100 MG/DL — HIGH (ref 70–99)
GLUCOSE UR QL: NEGATIVE MG/DL — SIGNIFICANT CHANGE UP
HCT VFR BLD CALC: 38 % — SIGNIFICANT CHANGE UP (ref 34.5–45)
HGB BLD-MCNC: 13.3 G/DL — SIGNIFICANT CHANGE UP (ref 11.5–15.5)
INR BLD: 0.91 RATIO — SIGNIFICANT CHANGE UP (ref 0.85–1.16)
KETONES UR-MCNC: NEGATIVE MG/DL — SIGNIFICANT CHANGE UP
LEUKOCYTE ESTERASE UR-ACNC: NEGATIVE — SIGNIFICANT CHANGE UP
MCHC RBC-ENTMCNC: 32.1 PG — SIGNIFICANT CHANGE UP (ref 27–34)
MCHC RBC-ENTMCNC: 35 G/DL — SIGNIFICANT CHANGE UP (ref 32–36)
MCV RBC AUTO: 91.8 FL — SIGNIFICANT CHANGE UP (ref 80–100)
NITRITE UR-MCNC: NEGATIVE — SIGNIFICANT CHANGE UP
NRBC # BLD: 0 /100 WBCS — SIGNIFICANT CHANGE UP (ref 0–0)
PH UR: 8 — SIGNIFICANT CHANGE UP (ref 5–8)
PLATELET # BLD AUTO: 116 K/UL — LOW (ref 150–400)
POTASSIUM SERPL-MCNC: 4 MMOL/L — SIGNIFICANT CHANGE UP (ref 3.5–5.3)
POTASSIUM SERPL-SCNC: 4 MMOL/L — SIGNIFICANT CHANGE UP (ref 3.5–5.3)
PROT UR-MCNC: NEGATIVE MG/DL — SIGNIFICANT CHANGE UP
PROTHROM AB SERPL-ACNC: 10.4 SEC — SIGNIFICANT CHANGE UP (ref 9.9–13.4)
RBC # BLD: 4.14 M/UL — SIGNIFICANT CHANGE UP (ref 3.8–5.2)
RBC # FLD: 15.9 % — HIGH (ref 10.3–14.5)
RH IG SCN BLD-IMP: POSITIVE — SIGNIFICANT CHANGE UP
SODIUM SERPL-SCNC: 136 MMOL/L — SIGNIFICANT CHANGE UP (ref 135–145)
SP GR SPEC: 1.01 — SIGNIFICANT CHANGE UP (ref 1–1.03)
T PALLIDUM AB TITR SER: NEGATIVE — SIGNIFICANT CHANGE UP
UROBILINOGEN FLD QL: 0.2 MG/DL — SIGNIFICANT CHANGE UP (ref 0.2–1)
WBC # BLD: 17.84 K/UL — HIGH (ref 3.8–10.5)
WBC # FLD AUTO: 17.84 K/UL — HIGH (ref 3.8–10.5)

## 2024-11-22 PROCEDURE — 88307 TISSUE EXAM BY PATHOLOGIST: CPT | Mod: 26

## 2024-11-22 PROCEDURE — 58150 TOTAL HYSTERECTOMY: CPT

## 2024-11-22 PROCEDURE — 52005 CYSTO W/URTRL CATHJ: CPT

## 2024-11-22 PROCEDURE — 88302 TISSUE EXAM BY PATHOLOGIST: CPT | Mod: 26

## 2024-11-22 PROCEDURE — 59510 CESAREAN DELIVERY: CPT | Mod: GC

## 2024-11-22 DEVICE — IMPLANTABLE DEVICE: Type: IMPLANTABLE DEVICE | Status: FUNCTIONAL

## 2024-11-22 DEVICE — GUIDEWIRE SENSOR DUAL-FLEX NITINOL STRAIGHT .035" X 150CM: Type: IMPLANTABLE DEVICE | Status: FUNCTIONAL

## 2024-11-22 RX ORDER — CHLORHEXIDINE GLUCONATE 1.2 MG/ML
1 RINSE ORAL DAILY
Refills: 0 | Status: DISCONTINUED | OUTPATIENT
Start: 2024-11-22 | End: 2024-11-22

## 2024-11-22 RX ORDER — HYDROMORPHONE HYDROCHLORIDE 2 MG/1
1 TABLET ORAL
Refills: 0 | Status: DISCONTINUED | OUTPATIENT
Start: 2024-11-22 | End: 2024-11-22

## 2024-11-22 RX ORDER — IBUPROFEN 200 MG
600 TABLET ORAL EVERY 6 HOURS
Refills: 0 | Status: COMPLETED | OUTPATIENT
Start: 2024-11-22 | End: 2025-10-21

## 2024-11-22 RX ORDER — 0.9 % SODIUM CHLORIDE 0.9 %
1000 INTRAVENOUS SOLUTION INTRAVENOUS
Refills: 0 | Status: DISCONTINUED | OUTPATIENT
Start: 2024-11-22 | End: 2024-11-22

## 2024-11-22 RX ORDER — 0.9 % SODIUM CHLORIDE 0.9 %
1000 INTRAVENOUS SOLUTION INTRAVENOUS
Refills: 0 | Status: DISCONTINUED | OUTPATIENT
Start: 2024-11-22 | End: 2024-11-25

## 2024-11-22 RX ORDER — OXYCODONE HYDROCHLORIDE 30 MG/1
5 TABLET ORAL
Refills: 0 | Status: DISCONTINUED | OUTPATIENT
Start: 2024-11-22 | End: 2024-11-25

## 2024-11-22 RX ORDER — FAMOTIDINE 20 MG/1
20 TABLET, FILM COATED ORAL ONCE
Refills: 0 | Status: COMPLETED | OUTPATIENT
Start: 2024-11-22 | End: 2024-11-22

## 2024-11-22 RX ORDER — TRISODIUM CITRATE DIHYDRATE AND CITRIC ACID MONOHYDRATE 500; 334 MG/5ML; MG/5ML
15 SOLUTION ORAL ONCE
Refills: 0 | Status: COMPLETED | OUTPATIENT
Start: 2024-11-22 | End: 2024-11-22

## 2024-11-22 RX ORDER — DIPHENHYDRAMINE HCL 25 MG
25 CAPSULE ORAL EVERY 6 HOURS
Refills: 0 | Status: DISCONTINUED | OUTPATIENT
Start: 2024-11-22 | End: 2024-11-25

## 2024-11-22 RX ORDER — OXYCODONE HYDROCHLORIDE 30 MG/1
10 TABLET ORAL
Refills: 0 | Status: DISCONTINUED | OUTPATIENT
Start: 2024-11-22 | End: 2024-11-23

## 2024-11-22 RX ORDER — SIMETHICONE 125 MG
80 CAPSULE ORAL EVERY 4 HOURS
Refills: 0 | Status: DISCONTINUED | OUTPATIENT
Start: 2024-11-22 | End: 2024-11-25

## 2024-11-22 RX ORDER — LANOLIN 72 %
1 OINTMENT (GRAM) TOPICAL EVERY 6 HOURS
Refills: 0 | Status: DISCONTINUED | OUTPATIENT
Start: 2024-11-22 | End: 2024-11-25

## 2024-11-22 RX ORDER — DEXAMETHASONE 1.5 MG/1
4 TABLET ORAL EVERY 6 HOURS
Refills: 0 | Status: DISCONTINUED | OUTPATIENT
Start: 2024-11-22 | End: 2024-11-23

## 2024-11-22 RX ORDER — OXYCODONE HYDROCHLORIDE 30 MG/1
5 TABLET ORAL ONCE
Refills: 0 | Status: DISCONTINUED | OUTPATIENT
Start: 2024-11-22 | End: 2024-11-25

## 2024-11-22 RX ORDER — KETOROLAC TROMETHAMINE 30 MG/ML
30 INJECTION INTRAMUSCULAR; INTRAVENOUS EVERY 6 HOURS
Refills: 0 | Status: DISCONTINUED | OUTPATIENT
Start: 2024-11-22 | End: 2024-11-23

## 2024-11-22 RX ORDER — NALOXONE HCL 0.4 MG/ML
0.1 AMPUL (ML) INJECTION
Refills: 0 | Status: DISCONTINUED | OUTPATIENT
Start: 2024-11-22 | End: 2024-11-23

## 2024-11-22 RX ORDER — ONDANSETRON HYDROCHLORIDE 4 MG/1
4 TABLET, FILM COATED ORAL EVERY 6 HOURS
Refills: 0 | Status: DISCONTINUED | OUTPATIENT
Start: 2024-11-22 | End: 2024-11-23

## 2024-11-22 RX ORDER — TETANUS TOXOID, REDUCED DIPHTHERIA TOXOID AND ACELLULAR PERTUSSIS VACCINE, ADSORBED 5; 2.5; 8; 8; 2.5 [IU]/.5ML; [IU]/.5ML; UG/.5ML; UG/.5ML; UG/.5ML
0.5 SUSPENSION INTRAMUSCULAR ONCE
Refills: 0 | Status: DISCONTINUED | OUTPATIENT
Start: 2024-11-22 | End: 2024-11-25

## 2024-11-22 RX ORDER — ONDANSETRON HYDROCHLORIDE 4 MG/1
4 TABLET, FILM COATED ORAL ONCE
Refills: 0 | Status: DISCONTINUED | OUTPATIENT
Start: 2024-11-22 | End: 2024-11-22

## 2024-11-22 RX ORDER — OXYCODONE HYDROCHLORIDE 30 MG/1
5 TABLET ORAL
Refills: 0 | Status: DISCONTINUED | OUTPATIENT
Start: 2024-11-22 | End: 2024-11-23

## 2024-11-22 RX ORDER — HEPARIN SODIUM,PORCINE 1000/ML
5000 VIAL (ML) INJECTION EVERY 12 HOURS
Refills: 0 | Status: DISCONTINUED | OUTPATIENT
Start: 2024-11-22 | End: 2024-11-25

## 2024-11-22 RX ORDER — CEFAZOLIN SODIUM 10 G
2000 VIAL (EA) INJECTION ONCE
Refills: 0 | Status: COMPLETED | OUTPATIENT
Start: 2024-11-22 | End: 2024-11-22

## 2024-11-22 RX ORDER — HYDROMORPHONE HYDROCHLORIDE 2 MG/1
0.5 TABLET ORAL
Refills: 0 | Status: DISCONTINUED | OUTPATIENT
Start: 2024-11-22 | End: 2024-11-22

## 2024-11-22 RX ORDER — ACETAMINOPHEN 500MG 500 MG/1
975 TABLET, COATED ORAL
Refills: 0 | Status: DISCONTINUED | OUTPATIENT
Start: 2024-11-22 | End: 2024-11-25

## 2024-11-22 RX ADMIN — ACETAMINOPHEN 500MG 975 MILLIGRAM(S): 500 TABLET, COATED ORAL at 19:55

## 2024-11-22 RX ADMIN — HYDROMORPHONE HYDROCHLORIDE 0.5 MILLIGRAM(S): 2 TABLET ORAL at 14:00

## 2024-11-22 RX ADMIN — ACETAMINOPHEN 500MG 975 MILLIGRAM(S): 500 TABLET, COATED ORAL at 19:56

## 2024-11-22 RX ADMIN — TRISODIUM CITRATE DIHYDRATE AND CITRIC ACID MONOHYDRATE 15 MILLILITER(S): 500; 334 SOLUTION ORAL at 06:26

## 2024-11-22 RX ADMIN — HYDROMORPHONE HYDROCHLORIDE 0.5 MILLIGRAM(S): 2 TABLET ORAL at 14:15

## 2024-11-22 RX ADMIN — Medication 5000 UNIT(S): at 18:55

## 2024-11-22 RX ADMIN — Medication 125 MILLILITER(S): at 15:13

## 2024-11-22 RX ADMIN — FAMOTIDINE 20 MILLIGRAM(S): 20 TABLET, FILM COATED ORAL at 06:27

## 2024-11-22 RX ADMIN — Medication 125 MILLILITER(S): at 21:47

## 2024-11-22 RX ADMIN — Medication 125 MILLILITER(S): at 06:27

## 2024-11-22 NOTE — OB PROVIDER H&P - HISTORY OF PRESENT ILLNESS
43 y/o  female with placenta accreta planned for repeat ,  hysterectomy on 2024 Main OR w/ Dr Del Valle and Dr Piedra ( JR 2025)    *****MFM (Dr. Carlos Gonzalez)  ***was on ASA 81mg until 10/31/2024  MRI Plevis: "Findings of Placenta Accreta spectrum include placental bulging at the   left anterior inferior margin and myometrial thinning along the anterior   placental margin along with loss of low T2 signal retroplacental line"    ObHx: pLTCS 2017   GynHx: Denies hx of fibroids, ovarian cysts, abnml PAP smears, STIs  MedHx: Denies hx of HTN, DM, asthma, thyroid problems, blood clots/bleeding problems, hx of blood transfusions.  Meds: PNV  All: NKDA  PSHx: CSx1  FHx: Denies hx of blood clots/bleeding problems  Social: Denies alcohol/tobacco/drug use in pregnancy  Psych: Denies hx of anxiety/depression     – Will accept blood transfusions? Yes      Objective  – VS  T(C): 36.4 (24 @ 07:11)  HR: 87 (24 @ 07:11)  BP: 96/63 (24 @ 07:11)  RR: 16 (24 @ 07:11)  SpO2: 97% (24 @ 07:11)      – PE:   CV: RRR  Pulm: breathing comfortably on RA  Abd: gravid, nontender  Extr: moving all extremities with ease      – FHT: baseline 130, mod variability, +accels, -decels  – [place]: quiet

## 2024-11-22 NOTE — OB PROVIDER DELIVERY SUMMARY - AS DELIV COMPLICATIONS OB
placenta increta/nuchal cord/post-partum hysterectomy placenta accreta/nuchal cord/peripartum hemorrhage/post-partum hysterectomy

## 2024-11-22 NOTE — OB RN PATIENT PROFILE - PARENTS VERBALIZED UNDERSTANDING OF THE SAFE SKIN TO SKIN POSITIONING OF THE NEWBORN.
Called and requested : pt mom  Order: labs  Reason for order: test for dairy allergy  Call back number: [phone]  Requests call back in regards to order: yes  Okay to leave detailed voice message: yes   Statement Selected

## 2024-11-22 NOTE — OB PROVIDER DELIVERY SUMMARY - NSPROVIDERDELIVERYNOTE_OBGYN_ALL_OB_FT
repeat classical CS, total hysterectomy, bilateral salpingectomy for placenta acceta spectrum @ 34w2d.  Insertion of Ureteral catheters prior to case by Urology, see separate brief operative note for details.   Viable male infant, vertex presentation, delivered via standard breech extraction via classical incision.   Moderate amount of adhesions between bladder and lower uterine segment. Increased vascularity and ballooning of the left lower uterine segment at site of placenta. Grossly fallopian tubes, ovaries.  Hysterotomy closed in a single layer with 1-0 loop PDS.   Please see separate brief operative note for details of hysterectomy.     QBL:  2815  IVF: 3500  PRBC: 3u  UOP: 500    Dictation# repeat classical CS, total hysterectomy, bilateral salpingectomy for placenta acceta spectrum @ 34w2d.  Insertion of Ureteral catheters prior to case by Urology, see separate brief operative note for details.   Viable male infant, vertex presentation, delivered via standard breech extraction via classical incision.   Moderate amount of adhesions between bladder and lower uterine segment. Increased vascularity and ballooning of the left lower uterine segment at site of placenta. Grossly fallopian tubes, ovaries.  Hysterotomy closed in a single layer with 1-0 loop PDS.   Please see separate brief operative note for details of hysterectomy.     QBL:  2815  IVF: 3500  PRBC: 3u  UOP: 500    Dictation#[id number] Vertical skin incision, repeat fundal classical CS, total hysterectomy, bilateral salpingectomy for placenta acceta spectrum @ 34w2d.  Insertion of Ureteral catheters prior to case by Urology, see separate brief operative note for details.   Viable male infant, vertex presentation, delivered via standard breech extraction via classical incision.   Moderate amount of adhesions between bladder and lower uterine segment. Increased vascularity and ballooning of the left lower uterine segment at site of placenta. Grossly fallopian tubes, ovaries.  Hysterotomy closed in a single layer with 1-0 loop PDS.   Please see separate brief operative note for details of hysterectomy.     QBL:  2815  IVF: 3500  PRBC: 3u  UOP: 500    Dictation#[id number]    Agree with above.  Archie SHEPHERD

## 2024-11-22 NOTE — OB PROVIDER H&P - ATTENDING COMMENTS
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 AMA with neg genetic testing. Prior CS , anatomy with suspicion for possible placenta increta however MRI without e/o bladder invasion. S/p BM 11/15 - , s/p NICU consult, anesthesia, urology and gyn onc consults. Planned for  hysterectomy, BS, placement of ureteral stents. Dw pt need for midline vertical incision, risks of bleeding and excessive PPH requiring transfusion, damage to surrounding organs, infection. Discussed likelihood of recovery in SICU. Consents signed and in chart. Plan for TXA preop. Hct 33. MRI images reviewed. On call for OR.    Vital Signs Last 24 Hrs  T(C): 36.4 (2024 07:11), Max: 36.4 (2024 06:33)  T(F): 97.5 (2024 06:36), Max: 97.5 (2024 06:33)  HR: 87 (2024 07:11) (87 - 87)  BP: 96/63 (2024 07:11) (96/63 - 96/63)  BP(mean): --  RR: 16 (2024 07:11) (16 - 16)  SpO2: 97% (2024 07:11) (97% - 97%)    Archie SHEPHERD

## 2024-11-22 NOTE — CHART NOTE - NSCHARTNOTEFT_GEN_A_CORE
Patient seen and examined at bedside, recently post-op.   Patient is awake and alert. No acute concerns at this time.  Pain well controlled.   Denies CP, SOB, N/V, dizziness, lightheadedness.     Vital Signs Last 24 Hours  T(C): 36.6 (24 @ 13:00), Max: 36.6 (24 @ 13:00)  HR: 63 (24 @ 15:00) (59 - 87)  BP: 87/50 (24 @ 13:00) (87/50 - 96/63)  RR: 17 (24 @ 15:00) (15 - 19)  SpO2: 99% (24 @ 15:00) (97% - 100%)    I&O's Summary    2024 07:01  -  2024 16:00  --------------------------------------------------------  IN: 0 mL / OUT: 135 mL / NET: -135 mL      Physical Exam:  Gen: Comfortable, NAD  Psych: appropriate mood & affect  CV/pulm: breathing comfortably on RA  Pulm: CTAB  Abd: Soft, appropriately tender, non-distended  Incision: midline vertical incision clean, dry, intact  : minimal spotting on pad  Ext: Warm & well perfused. No edema or tenderness bilaterally    MEDICATIONS  (STANDING):  acetaminophen     Tablet .. 975 milliGRAM(s) Oral <User Schedule>  diphtheria/tetanus/pertussis (acellular) Vaccine (Adacel) 0.5 milliLiter(s) IntraMuscular once  ibuprofen  Tablet. 600 milliGRAM(s) Oral every 6 hours  ketorolac   Injectable 30 milliGRAM(s) IV Push every 6 hours  lactated ringers. 1000 milliLiter(s) (125 mL/Hr) IV Continuous <Continuous>  morphine PF Spinal 0.1 milliGRAM(s) IntraThecal. once    MEDICATIONS  (PRN):  dexAMETHasone  Injectable 4 milliGRAM(s) IV Push every 6 hours PRN Nausea  diphenhydrAMINE 25 milliGRAM(s) Oral every 6 hours PRN Pruritus  HYDROmorphone  Injectable 0.5 milliGRAM(s) IV Push every 10 minutes PRN Moderate Pain (4 - 6)  HYDROmorphone  Injectable 1 milliGRAM(s) IV Push every 10 minutes PRN Severe Pain (7 - 10)  lanolin Ointment 1 Application(s) Topical every 6 hours PRN Sore Nipples  magnesium hydroxide Suspension 30 milliLiter(s) Oral two times a day PRN Constipation  naloxone Injectable 0.1 milliGRAM(s) IV Push every 3 minutes PRN For ANY of the following changes in patient status:  A. Breaths Per Minute LESS THAN 10, B. Oxygen saturation LESS THAN 90%, C. Sedation score of 6 for Stop After: 4 Times  ondansetron Injectable 4 milliGRAM(s) IV Push every 6 hours PRN Nausea  ondansetron Injectable 4 milliGRAM(s) IV Push once PRN Nausea and/or Vomiting  oxyCODONE    IR 5 milliGRAM(s) Oral every 3 hours PRN Moderate to Severe Pain (4-10)  oxyCODONE    IR 5 milliGRAM(s) Oral once PRN Moderate to Severe Pain (4-10)  oxyCODONE    IR 5 milliGRAM(s) Oral every 3 hours PRN Mild Pain (1 - 3)  oxyCODONE    IR 10 milliGRAM(s) Oral every 3 hours PRN Moderate Pain (4 - 6)  simethicone 80 milliGRAM(s) Chew every 4 hours PRN Gas      A/P: POD#0 s/p  total hysterectomy, bilateral salpingectomy, insertion and removal of ureteral catheters. Patient doing well post-op.   Neuro: PO Motrin, tylenol, oxycodone PRN. S/p spinal/epidural with duramorph.    CV: Hemodynamically stable.  Monitor VS.  Pending 5pm labs. AM CBC.   Pulm: Saturating well on room air.  Encourage OOB and incentive spirometer use.   GI: Regular diet. Anti-emetics PRN.  : Bernard to gravity. UOP adequate. S/p bilateral ureteral catheters.   FEN: SLIV when tolerating PO.   Heme: HSQ/ DVT ppx w/ SCD's while in bed.   ID: Afebrile, continue to monitor fever curve, leukoctyosis.   Endo:  No active issues   Dispo: pending 5pm labs, dispo to postpartum unit.     D/w Dr Ivon Weiss-Pike PGY4

## 2024-11-22 NOTE — OB RN DELIVERY SUMMARY - NS_GENERALBABYACOMMENTA_OBGYN_ALL_OB_FT
maternal clinical indication for not doing STS and BF - under general anesthesia in main OR for scheduled hysterectomy following  delivery     clinical indication for not doing STS and BF - 34.2 weeks GA, CPAP for 15min --> straight to NICU

## 2024-11-22 NOTE — CHART NOTE - NSCHARTNOTEFT_GEN_A_CORE
Urinalysis reviewed, negative. Discussed with urology, will proceed to OR.    D/w Dr Ivon Weiss-Pike PGY4

## 2024-11-22 NOTE — OB RN DELIVERY SUMMARY - NS_SEPSISRSKCALC_OBGYN_ALL_OB_FT
EOS calculated successfully. EOS Risk Factor: 0.5/1000 live births ([facility] national incidence); GA=34w2d; Temp=97.5; ROM=0.017; GBS='Negative'; Antibiotics='No antibiotics or any antibiotics < 2 hrs prior to birth'

## 2024-11-22 NOTE — BRIEF OPERATIVE NOTE - NSICDXBRIEFPOSTOP_GEN_ALL_CORE_FT
POST-OP DIAGNOSIS:  Encounter for ureteral catheter placement 22-Nov-2024 10:09:19  Alva Stratton  
POST-OP DIAGNOSIS:  Placenta accreta 22-Nov-2024 15:33:51  Sushma Magallon

## 2024-11-22 NOTE — OB PROVIDER DELIVERY SUMMARY - NS_GESTAGEATBIRTHA_OBGYN_ALL_OB_FT
Called insurance back and still no response  Given two new numbers of [phone]  Still no response     Tried faxing on other fax machine and no response for either numbers given Dr Alas     I spoke to Javier who states the clarification that is needed is:    Is it that machine that she was working with?  Or   Is it that type of machine that she was working with?  Or   Is it all types of machines?     Please create letter as to specifics  Advise coordinator when complete for faxing    34w2d

## 2024-11-22 NOTE — OB PROVIDER H&P - ASSESSMENT
45 y/o  female with placenta accreta planned for repeat ,  hysterectomy on 2024 Main OR w/ Dr Del Valle and Dr Piedra ( JR 2025)    PLAN  - Admit to L&D  - Routine labs, IVF, NPO  - Reglan/Pepcid/Bicitra  - Abdominal prep, PAS, puckett to bedside drainage  - EFM: NSt reactive  - Anesthesia consult  - Urology consult   - PRBC hold  - TXA prior to delivery     D/w Dr. Ivon Weiss-Pike PGY4

## 2024-11-22 NOTE — OB NEONATOLOGY/PEDIATRICIAN DELIVERY SUMMARY - NSPEDSNEONOTESA_OBGYN_ALL_OB_FT
Requested by OB to attend this  delivery at 34.2 weeks for maternal increta in main OR, delivery attended by KULDEEP Cornejo . Mother is a 44 year old,  , blood type  B  pos.  Prenatal labs as follow: HIV neg, RPR non-reactive, rubella immune, HBsA neg, GBS neg on 11/15.  Maternal history significant for miscarriages x 2, breast cyst removal.  Received BMZ  -.  This pregnancy was complicated by  increta. ROM at delivery with clear  fluid.  Infant emerged breech, vigorous, with good tone. Nuchal x 1. Delayed cord clamping X  30 secs, then brought to warmer. Dried, suctioned and stimulated.  Intermittent apnea and started labored breathing at about 3 min of life CPAP +5 at 30% started and FiO2 adjusted by minutes of life.  Physical exam WNL grossly.   Apgars  7/8. Consents to hep B vaccine. Consents to circumcision. Infant admitted to NICU, transferred on CPAP+5 at 30% for further management of care. Mom updated.

## 2024-11-22 NOTE — BRIEF OPERATIVE NOTE - NSICDXBRIEFPROCEDURE_GEN_ALL_CORE_FT
PROCEDURES:  Cystoscopy, with ureteral catheterization 22-Nov-2024 10:08:46  Alva Stratton  
PROCEDURES:  Total hysterectomy after  section 2024 15:33:14  Sushma Magallon  Bilateral salpingectomy 2024 15:33:26  Susmha Magallon

## 2024-11-22 NOTE — BRIEF OPERATIVE NOTE - OPERATION/FINDINGS
Cystoscopic insertion of bilateral ureteral stent placement 
repeat classical CS, total hysterectomy, bilateral salpingectomy for placenta accreta spectrum @ 34w2d.  Insertion of Ureteral catheters prior to case by Urology, see separate brief operative note for details.   Viable male infant, delivered via standard breech extraction via classical incision.   Moderate amount of adhesions between bladder and lower uterine segment. Increased vascularity and ballooning of the left lower uterine segment at site of placenta. Grossly normal fallopian tubes, ovaries. Excellent hemostasis noted.   Ureteral catheters removed at the end of the case.

## 2024-11-22 NOTE — OB RN DELIVERY SUMMARY - NSSELHIDDEN_OBGYN_ALL_OB_FT
[[email or web address]_ALL_OB_FT:[email or web address]=],[NS_DeliveryAssist1_OBGYN_ALL_OB_FT:[email or web address]=]

## 2024-11-22 NOTE — PRE-ANESTHESIA EVALUATION ADULT - NSANTHPMHFT_GEN_ALL_CORE
>4 mets  denies HTN, pre-eclampsia  denies prior issues with GA or NA   denies back issues, easy bleeding, bruising

## 2024-11-22 NOTE — BRIEF OPERATIVE NOTE - NSICDXBRIEFPREOP_GEN_ALL_CORE_FT
PRE-OP DIAGNOSIS:  Encounter for ureteral catheter placement 22-Nov-2024 10:09:12  Alva Stratton  
PRE-OP DIAGNOSIS:  Placenta accreta 22-Nov-2024 15:33:47  Sushma Magallon  Intrauterine pregnancy 22-Nov-2024 15:34:17  Sushma Magallon

## 2024-11-22 NOTE — PRE-ANESTHESIA EVALUATION ADULT - NSANTHADDINFOFT_GEN_ALL_CORE
patient seen and examined  risk benefit discussion  all questions answered   risks, benefits, process for neuraxial and regional anesthesia discussed  will obtain vascular access (a line, large bore IV) on arrival to room  will place CSE after vascular access   discussed possible conversion to GA after delivery   possible need for central venous access discussed  possibility of massive transfusion, prolonged intubation and ventilatory/pressor support post op discussed patient seen and examined  risk benefit discussion  all questions answered   risks, benefits, process for neuraxial and general anesthesia discussed  will obtain vascular access (a line, large bore IV) on arrival to room  will place CSE after vascular access   discussed possible conversion to GA after delivery   possible need for central venous access discussed  possibility of massive transfusion, prolonged/post op intubation and ventilatory/pressor support post op discussed

## 2024-11-22 NOTE — OB PROVIDER H&P - NSLOWPPHRISK_OBGYN_A_OB
Peoples Pregnancy/Less than or equal to 4 previous vaginal births/No known bleeding disorder/No history of postpartum hemorrhage

## 2024-11-23 LAB
BASOPHILS # BLD AUTO: 0.03 K/UL — SIGNIFICANT CHANGE UP (ref 0–0.2)
BASOPHILS NFR BLD AUTO: 0.2 % — SIGNIFICANT CHANGE UP (ref 0–2)
EOSINOPHIL # BLD AUTO: 0.07 K/UL — SIGNIFICANT CHANGE UP (ref 0–0.5)
EOSINOPHIL NFR BLD AUTO: 0.5 % — SIGNIFICANT CHANGE UP (ref 0–6)
HCT VFR BLD CALC: 31.1 % — LOW (ref 34.5–45)
HCT VFR BLD CALC: 32.2 % — LOW (ref 34.5–45)
HGB BLD-MCNC: 10.7 G/DL — LOW (ref 11.5–15.5)
HGB BLD-MCNC: 11 G/DL — LOW (ref 11.5–15.5)
IMM GRANULOCYTES NFR BLD AUTO: 1.5 % — HIGH (ref 0–0.9)
LYMPHOCYTES # BLD AUTO: 1.32 K/UL — SIGNIFICANT CHANGE UP (ref 1–3.3)
LYMPHOCYTES # BLD AUTO: 9.7 % — LOW (ref 13–44)
MCHC RBC-ENTMCNC: 31.8 PG — SIGNIFICANT CHANGE UP (ref 27–34)
MCHC RBC-ENTMCNC: 31.8 PG — SIGNIFICANT CHANGE UP (ref 27–34)
MCHC RBC-ENTMCNC: 34.2 G/DL — SIGNIFICANT CHANGE UP (ref 32–36)
MCHC RBC-ENTMCNC: 34.4 G/DL — SIGNIFICANT CHANGE UP (ref 32–36)
MCV RBC AUTO: 92.3 FL — SIGNIFICANT CHANGE UP (ref 80–100)
MCV RBC AUTO: 93.1 FL — SIGNIFICANT CHANGE UP (ref 80–100)
MONOCYTES # BLD AUTO: 0.91 K/UL — HIGH (ref 0–0.9)
MONOCYTES NFR BLD AUTO: 6.7 % — SIGNIFICANT CHANGE UP (ref 2–14)
NEUTROPHILS # BLD AUTO: 11.07 K/UL — HIGH (ref 1.8–7.4)
NEUTROPHILS NFR BLD AUTO: 81.4 % — HIGH (ref 43–77)
NRBC # BLD: 0 /100 WBCS — SIGNIFICANT CHANGE UP (ref 0–0)
NRBC # BLD: 0 /100 WBCS — SIGNIFICANT CHANGE UP (ref 0–0)
PLATELET # BLD AUTO: 100 K/UL — LOW (ref 150–400)
PLATELET # BLD AUTO: 110 K/UL — LOW (ref 150–400)
RBC # BLD: 3.37 M/UL — LOW (ref 3.8–5.2)
RBC # BLD: 3.46 M/UL — LOW (ref 3.8–5.2)
RBC # FLD: 15.9 % — HIGH (ref 10.3–14.5)
RBC # FLD: 16.2 % — HIGH (ref 10.3–14.5)
WBC # BLD: 12.8 K/UL — HIGH (ref 3.8–10.5)
WBC # BLD: 13.6 K/UL — HIGH (ref 3.8–10.5)
WBC # FLD AUTO: 12.8 K/UL — HIGH (ref 3.8–10.5)
WBC # FLD AUTO: 13.6 K/UL — HIGH (ref 3.8–10.5)

## 2024-11-23 RX ORDER — IBUPROFEN 200 MG
600 TABLET ORAL EVERY 6 HOURS
Refills: 0 | Status: DISCONTINUED | OUTPATIENT
Start: 2024-11-23 | End: 2024-11-25

## 2024-11-23 RX ADMIN — Medication 125 MILLILITER(S): at 05:37

## 2024-11-23 RX ADMIN — Medication 5000 UNIT(S): at 05:34

## 2024-11-23 RX ADMIN — KETOROLAC TROMETHAMINE 30 MILLIGRAM(S): 30 INJECTION INTRAMUSCULAR; INTRAVENOUS at 05:34

## 2024-11-23 RX ADMIN — ACETAMINOPHEN 500MG 975 MILLIGRAM(S): 500 TABLET, COATED ORAL at 04:36

## 2024-11-23 RX ADMIN — KETOROLAC TROMETHAMINE 30 MILLIGRAM(S): 30 INJECTION INTRAMUSCULAR; INTRAVENOUS at 17:39

## 2024-11-23 RX ADMIN — KETOROLAC TROMETHAMINE 30 MILLIGRAM(S): 30 INJECTION INTRAMUSCULAR; INTRAVENOUS at 11:51

## 2024-11-23 RX ADMIN — ACETAMINOPHEN 500MG 975 MILLIGRAM(S): 500 TABLET, COATED ORAL at 16:10

## 2024-11-23 RX ADMIN — KETOROLAC TROMETHAMINE 30 MILLIGRAM(S): 30 INJECTION INTRAMUSCULAR; INTRAVENOUS at 06:14

## 2024-11-23 RX ADMIN — KETOROLAC TROMETHAMINE 30 MILLIGRAM(S): 30 INJECTION INTRAMUSCULAR; INTRAVENOUS at 18:56

## 2024-11-23 RX ADMIN — ACETAMINOPHEN 500MG 975 MILLIGRAM(S): 500 TABLET, COATED ORAL at 03:00

## 2024-11-23 RX ADMIN — Medication 600 MILLIGRAM(S): at 23:10

## 2024-11-23 RX ADMIN — KETOROLAC TROMETHAMINE 30 MILLIGRAM(S): 30 INJECTION INTRAMUSCULAR; INTRAVENOUS at 00:12

## 2024-11-23 RX ADMIN — ACETAMINOPHEN 500MG 975 MILLIGRAM(S): 500 TABLET, COATED ORAL at 21:21

## 2024-11-23 RX ADMIN — ACETAMINOPHEN 500MG 975 MILLIGRAM(S): 500 TABLET, COATED ORAL at 09:24

## 2024-11-23 RX ADMIN — KETOROLAC TROMETHAMINE 30 MILLIGRAM(S): 30 INJECTION INTRAMUSCULAR; INTRAVENOUS at 00:05

## 2024-11-23 RX ADMIN — ACETAMINOPHEN 500MG 975 MILLIGRAM(S): 500 TABLET, COATED ORAL at 22:16

## 2024-11-23 RX ADMIN — ACETAMINOPHEN 500MG 975 MILLIGRAM(S): 500 TABLET, COATED ORAL at 15:25

## 2024-11-23 RX ADMIN — Medication 5000 UNIT(S): at 17:39

## 2024-11-23 RX ADMIN — ACETAMINOPHEN 500MG 975 MILLIGRAM(S): 500 TABLET, COATED ORAL at 10:42

## 2024-11-23 RX ADMIN — Medication 80 MILLIGRAM(S): at 05:46

## 2024-11-23 RX ADMIN — KETOROLAC TROMETHAMINE 30 MILLIGRAM(S): 30 INJECTION INTRAMUSCULAR; INTRAVENOUS at 12:44

## 2024-11-23 NOTE — PROGRESS NOTE ADULT - SUBJECTIVE AND OBJECTIVE BOX
Day 1 of Anesthesia Pain Management Service    SUBJECTIVE:  Pain Scale Score:          [X] Refer to charted pain scores    THERAPY:    s/p neuraxial PF morphine    MEDICATIONS  (STANDING):  acetaminophen     Tablet .. 975 milliGRAM(s) Oral <User Schedule>  diphtheria/tetanus/pertussis (acellular) Vaccine (Adacel) 0.5 milliLiter(s) IntraMuscular once  heparin   Injectable 5000 Unit(s) SubCutaneous every 12 hours  ibuprofen  Tablet. 600 milliGRAM(s) Oral every 6 hours  ketorolac   Injectable 30 milliGRAM(s) IV Push every 6 hours  lactated ringers. 1000 milliLiter(s) (125 mL/Hr) IV Continuous <Continuous>    MEDICATIONS  (PRN):  diphenhydrAMINE 25 milliGRAM(s) Oral every 6 hours PRN Pruritus  lanolin Ointment 1 Application(s) Topical every 6 hours PRN Sore Nipples  magnesium hydroxide Suspension 30 milliLiter(s) Oral two times a day PRN Constipation  oxyCODONE    IR 5 milliGRAM(s) Oral every 3 hours PRN Moderate to Severe Pain (4-10)  oxyCODONE    IR 5 milliGRAM(s) Oral once PRN Moderate to Severe Pain (4-10)  simethicone 80 milliGRAM(s) Chew every 4 hours PRN Gas      OBJECTIVE:    Sedation:        	[X] Alert	[ ] Drowsy	[ ] Arousable      [ ] Asleep       [ ] Unresponsive    Side Effects:	[X] None	[ ] Nausea	[ ] Vomiting         [ ] Pruritus  		[ ] Weakness            [ ] Numbness	          [ ] Other:    Vital Signs Last 24 Hrs  T(C): 36.7 (23 Nov 2024 09:13), Max: 37.4 (22 Nov 2024 20:34)  T(F): 98.1 (23 Nov 2024 09:13), Max: 99.4 (22 Nov 2024 20:34)  HR: 67 (23 Nov 2024 09:13) (59 - 76)  BP: 99/64 (23 Nov 2024 09:13) (87/50 - 99/64)  BP(mean): 69 (22 Nov 2024 20:00) (64 - 73)  RR: 18 (23 Nov 2024 09:13) (15 - 19)  SpO2: 95% (23 Nov 2024 09:13) (94% - 100%)    Parameters below as of 23 Nov 2024 09:13  Patient On (Oxygen Delivery Method): room air        ASSESSMENT/ PLAN  [X] Patient transitioned to prn analgesics  [X] Pain management per primary service, pain service to sign off   [X]Documentation and Verification of current medications

## 2024-11-23 NOTE — PROGRESS NOTE ADULT - ASSESSMENT
A/P: 43yo POD #1 s/p  hysterectomy. Pt is s/p 3u PRBC intra-operatively.  Patient is stable and doing well. No events overnight.      #Postpartum recovery from  hysterectomy  - Continue regular diet.  - Increase ambulation.  - PO pain medication with Tylenol, Motrin and Oxycodone PRN for pain control.    - POD #1 CBC this morning.   - d/c puckett this AM pending labs  - DVT prophylaxis with Heparin 5000u BID    Sandra Nuñez PGY3   A/P: 45yo POD #1 s/p  hysterectomy. Pt is s/p 3u PRBC intra-operatively.  Patient is stable and doing well. No events overnight.      #Postpartum recovery from  hysterectomy  - Continue regular diet.  - Increase ambulation.  - PO pain medication with Tylenol, Motrin and Oxycodone PRN for pain control.    - POD #1 CBC this morning.   - d/c italo this AM  - DVT prophylaxis with Heparin 5000u BID    Sandra Nuñez PGY3

## 2024-11-23 NOTE — PROGRESS NOTE ADULT - SUBJECTIVE AND OBJECTIVE BOX
OB Postpartum Note:  Hysterectomy     S: 43yo now POD #1 s/p -hysterectomy for PAS. Her pain is well controlled. She is tolerating a regular diet but has not yet passed flatus. Bernard remains in place. Denies N/V. Denies CP/SOB/lightheadedness/dizziness/headache/epigastric pain/changes in vision.    O:   Vitals:  Vital Signs Last 24 Hrs  T(C): 37.2 (2024 01:13), Max: 37.4 (2024 20:34)  T(F): 99 (:13), Max: 99.4 (2024 20:34)  HR: 66 (:34) (59 - 87)  BP: 98/65 (:34) (87/50 - 98/65)  BP(mean): 69 (2024 20:00) (64 - 73)  RR: 18 (:13) (15 - 19)  SpO2: 97% (:13) (96% - 100%)    Parameters below as of 2024 01:13  Patient On (Oxygen Delivery Method): room air        MEDICATIONS  (STANDING):  acetaminophen     Tablet .. 975 milliGRAM(s) Oral <User Schedule>  diphtheria/tetanus/pertussis (acellular) Vaccine (Adacel) 0.5 milliLiter(s) IntraMuscular once  heparin   Injectable 5000 Unit(s) SubCutaneous every 12 hours  ibuprofen  Tablet. 600 milliGRAM(s) Oral every 6 hours  ketorolac   Injectable 30 milliGRAM(s) IV Push every 6 hours  lactated ringers. 1000 milliLiter(s) (125 mL/Hr) IV Continuous <Continuous>  morphine PF Spinal 0.1 milliGRAM(s) IntraThecal. once    MEDICATIONS  (PRN):  dexAMETHasone  Injectable 4 milliGRAM(s) IV Push every 6 hours PRN Nausea  diphenhydrAMINE 25 milliGRAM(s) Oral every 6 hours PRN Pruritus  lanolin Ointment 1 Application(s) Topical every 6 hours PRN Sore Nipples  magnesium hydroxide Suspension 30 milliLiter(s) Oral two times a day PRN Constipation  naloxone Injectable 0.1 milliGRAM(s) IV Push every 3 minutes PRN For ANY of the following changes in patient status:  A. Breaths Per Minute LESS THAN 10, B. Oxygen saturation LESS THAN 90%, C. Sedation score of 6 for Stop After: 4 Times  ondansetron Injectable 4 milliGRAM(s) IV Push every 6 hours PRN Nausea  oxyCODONE    IR 5 milliGRAM(s) Oral every 3 hours PRN Moderate to Severe Pain (4-10)  oxyCODONE    IR 5 milliGRAM(s) Oral once PRN Moderate to Severe Pain (4-10)  oxyCODONE    IR 5 milliGRAM(s) Oral every 3 hours PRN Mild Pain (1 - 3)  oxyCODONE    IR 10 milliGRAM(s) Oral every 3 hours PRN Moderate Pain (4 - 6)  simethicone 80 milliGRAM(s) Chew every 4 hours PRN Gas      Labs:  Blood type: B Positive  Rubella IgG: RPR: Negative                          13.3   17.84[H] >-----------< 116[L]    (  @ 17:06 )             38.0    - @ 17:06      136  |  108  |  5[L]  ----------------------------<  100[H]  4.0   |  18[L]  |  0.34[L]        Ca    7.1[L]      2024 17:06            PE:  General: NAD, patient resting comfortably in bed  Abdomen: Mildly distended, appropriately tender. Fundus firm.  Incision: Clean, dry, intact.  : appropriate amount of lochia on pad  Extremities: SCDs in place, no erythema     OB Postpartum Note:  Hysterectomy     S: 45yo now POD #1 s/p -hysterectomy for placenta accreta spectrum. Her pain is well controlled. She is tolerating a regular diet but has not yet passed flatus. Bernard remains in place. Denies N/V. Denies CP/SOB/lightheadedness/dizziness/headache/epigastric pain/changes in vision.  EBL during case: 2815, s/p 3 units PRBC    O:   Vitals:  Vital Signs Last 24 Hrs  T(C): 37.2 (:13), Max: 37.4 (2024 20:34)  T(F): 99 (:13), Max: 99.4 (2024 20:34)  HR: 66 (2024 20:34) (59 - 87)  BP: 98/65 (2024 20:34) (87/50 - 98/65)  BP(mean): 69 (2024 20:00) (64 - 73)  RR: 18 (:13) (15 - 19)  SpO2: 97% (:13) (96% - 100%)    Parameters below as of 2024 01:13  Patient On (Oxygen Delivery Method): room air        MEDICATIONS  (STANDING):  acetaminophen     Tablet .. 975 milliGRAM(s) Oral <User Schedule>  diphtheria/tetanus/pertussis (acellular) Vaccine (Adacel) 0.5 milliLiter(s) IntraMuscular once  heparin   Injectable 5000 Unit(s) SubCutaneous every 12 hours  ibuprofen  Tablet. 600 milliGRAM(s) Oral every 6 hours  ketorolac   Injectable 30 milliGRAM(s) IV Push every 6 hours  lactated ringers. 1000 milliLiter(s) (125 mL/Hr) IV Continuous <Continuous>  morphine PF Spinal 0.1 milliGRAM(s) IntraThecal. once    MEDICATIONS  (PRN):  dexAMETHasone  Injectable 4 milliGRAM(s) IV Push every 6 hours PRN Nausea  diphenhydrAMINE 25 milliGRAM(s) Oral every 6 hours PRN Pruritus  lanolin Ointment 1 Application(s) Topical every 6 hours PRN Sore Nipples  magnesium hydroxide Suspension 30 milliLiter(s) Oral two times a day PRN Constipation  naloxone Injectable 0.1 milliGRAM(s) IV Push every 3 minutes PRN For ANY of the following changes in patient status:  A. Breaths Per Minute LESS THAN 10, B. Oxygen saturation LESS THAN 90%, C. Sedation score of 6 for Stop After: 4 Times  ondansetron Injectable 4 milliGRAM(s) IV Push every 6 hours PRN Nausea  oxyCODONE    IR 5 milliGRAM(s) Oral every 3 hours PRN Moderate to Severe Pain (4-10)  oxyCODONE    IR 5 milliGRAM(s) Oral once PRN Moderate to Severe Pain (4-10)  oxyCODONE    IR 5 milliGRAM(s) Oral every 3 hours PRN Mild Pain (1 - 3)  oxyCODONE    IR 10 milliGRAM(s) Oral every 3 hours PRN Moderate Pain (4 - 6)  simethicone 80 milliGRAM(s) Chew every 4 hours PRN Gas      Labs:  Blood type: B Positive  Rubella IgG: RPR: Negative                          13.3   17.84[H] >-----------< 116[L]    (  @ 17:06 )             38.0    24 @ 17:06      136  |  108  |  5[L]  ----------------------------<  100[H]  4.0   |  18[L]  |  0.34[L]        Ca    7.1[L]      2024 17:06            PE:  General: NAD, patient resting comfortably in bed  Abdomen: Mildly distended, appropriately tender. Fundus firm.  Incision: Clean, dry, intact.  : appropriate amount of lochia on pad  Extremities: SCDs in place, no erythema

## 2024-11-23 NOTE — CHART NOTE - NSCHARTNOTEFT_GEN_A_CORE
Gyn Onc Chart Note     45yo P2 POD#1 s/p  hysterectomy for placenta accreta spectrum.   Feeling well this morning, pain well controlled. Tolerating PO. Ambulating without difficulty. Bernard catheter removed, has not yet voided. Denies chest pain, palpitations, SOB, lightheadedness.     VS, Labs reviewed   Patient well appearing, ambulating in room   Abdomen soft, nontender, incision clean/dry/intact     Appreciate care per Obstetrics team, please see AM progress note for full assessment and plan   H/H with greater than expected drop, stable on repeat, VS appropriate.   Continue reg diet   Continue pain regimen   DVT ppx: TOBIAS, SCDs, ambulation     Chayo Cabrales MD Gyn Onc Chart Note     45yo P2 POD#1 s/p  hysterectomy for placenta accreta spectrum.   Feeling well this morning, pain well controlled. Tolerating PO. Ambulating without difficulty. Bernard catheter removed, has not yet voided. Denies chest pain, palpitations, SOB, lightheadedness.     VS, Labs reviewed   Patient well appearing, ambulating in room   Abdomen soft, nontender, incision clean/dry/intact     Appreciate care per Obstetrics team, please see AM progress note for full assessment and plan   H/H with greater than expected drop, stable on repeat, VS appropriate.   Continue reg diet   Continue pain regimen   DVT ppx: TOBIAS, SCDs, ambulation     Chayo Cabrales MD    Patient seen and evaluated with Dr Cabrales in am of   Patient was present with  and her son.  Baby is doing well per patient  Patient was already up and walking after her C/Hyst on .    Progressing well after her surgery.  No further intervention needed from our prosepective  Sign off case.  Care per Ob team.

## 2024-11-24 LAB
-  AMOXICILLIN/CLAVULANIC ACID: SIGNIFICANT CHANGE UP
-  AMPICILLIN/SULBACTAM: SIGNIFICANT CHANGE UP
-  AMPICILLIN: SIGNIFICANT CHANGE UP
-  AMPICILLIN: SIGNIFICANT CHANGE UP
-  AZTREONAM: SIGNIFICANT CHANGE UP
-  CEFAZOLIN: SIGNIFICANT CHANGE UP
-  CEFEPIME: SIGNIFICANT CHANGE UP
-  CEFOXITIN: SIGNIFICANT CHANGE UP
-  CEFTRIAXONE: SIGNIFICANT CHANGE UP
-  CEFUROXIME: SIGNIFICANT CHANGE UP
-  CIPROFLOXACIN: SIGNIFICANT CHANGE UP
-  CIPROFLOXACIN: SIGNIFICANT CHANGE UP
-  ERTAPENEM: SIGNIFICANT CHANGE UP
-  GENTAMICIN: SIGNIFICANT CHANGE UP
-  IMIPENEM: SIGNIFICANT CHANGE UP
-  LEVOFLOXACIN: SIGNIFICANT CHANGE UP
-  LEVOFLOXACIN: SIGNIFICANT CHANGE UP
-  MEROPENEM: SIGNIFICANT CHANGE UP
-  NITROFURANTOIN: SIGNIFICANT CHANGE UP
-  NITROFURANTOIN: SIGNIFICANT CHANGE UP
-  PIPERACILLIN/TAZOBACTAM: SIGNIFICANT CHANGE UP
-  TETRACYCLINE: SIGNIFICANT CHANGE UP
-  TOBRAMYCIN: SIGNIFICANT CHANGE UP
-  TRIMETHOPRIM/SULFAMETHOXAZOLE: SIGNIFICANT CHANGE UP
-  VANCOMYCIN: SIGNIFICANT CHANGE UP
CULTURE RESULTS: ABNORMAL
HCT VFR BLD CALC: 33.6 % — LOW (ref 34.5–45)
HCT VFR BLD CALC: 35.1 % — SIGNIFICANT CHANGE UP (ref 34.5–45)
HGB BLD-MCNC: 11.5 G/DL — SIGNIFICANT CHANGE UP (ref 11.5–15.5)
HGB BLD-MCNC: 12 G/DL — SIGNIFICANT CHANGE UP (ref 11.5–15.5)
MCHC RBC-ENTMCNC: 31.4 PG — SIGNIFICANT CHANGE UP (ref 27–34)
MCHC RBC-ENTMCNC: 31.8 PG — SIGNIFICANT CHANGE UP (ref 27–34)
MCHC RBC-ENTMCNC: 34.2 G/DL — SIGNIFICANT CHANGE UP (ref 32–36)
MCHC RBC-ENTMCNC: 34.2 G/DL — SIGNIFICANT CHANGE UP (ref 32–36)
MCV RBC AUTO: 91.8 FL — SIGNIFICANT CHANGE UP (ref 80–100)
MCV RBC AUTO: 93.1 FL — SIGNIFICANT CHANGE UP (ref 80–100)
METHOD TYPE: SIGNIFICANT CHANGE UP
METHOD TYPE: SIGNIFICANT CHANGE UP
NRBC # BLD: 0 /100 WBCS — SIGNIFICANT CHANGE UP (ref 0–0)
NRBC # BLD: 0 /100 WBCS — SIGNIFICANT CHANGE UP (ref 0–0)
ORGANISM # SPEC MICROSCOPIC CNT: ABNORMAL
PLATELET # BLD AUTO: 139 K/UL — LOW (ref 150–400)
PLATELET # BLD AUTO: 139 K/UL — LOW (ref 150–400)
RBC # BLD: 3.66 M/UL — LOW (ref 3.8–5.2)
RBC # BLD: 3.77 M/UL — LOW (ref 3.8–5.2)
RBC # FLD: 15.3 % — HIGH (ref 10.3–14.5)
RBC # FLD: 15.3 % — HIGH (ref 10.3–14.5)
SPECIMEN SOURCE: SIGNIFICANT CHANGE UP
WBC # BLD: 10.67 K/UL — HIGH (ref 3.8–10.5)
WBC # BLD: 10.71 K/UL — HIGH (ref 3.8–10.5)
WBC # FLD AUTO: 10.67 K/UL — HIGH (ref 3.8–10.5)
WBC # FLD AUTO: 10.71 K/UL — HIGH (ref 3.8–10.5)

## 2024-11-24 RX ADMIN — Medication 5000 UNIT(S): at 05:03

## 2024-11-24 RX ADMIN — ACETAMINOPHEN 500MG 975 MILLIGRAM(S): 500 TABLET, COATED ORAL at 03:17

## 2024-11-24 RX ADMIN — ACETAMINOPHEN 500MG 975 MILLIGRAM(S): 500 TABLET, COATED ORAL at 17:09

## 2024-11-24 RX ADMIN — Medication 600 MILLIGRAM(S): at 13:34

## 2024-11-24 RX ADMIN — Medication 600 MILLIGRAM(S): at 00:10

## 2024-11-24 RX ADMIN — Medication 600 MILLIGRAM(S): at 05:03

## 2024-11-24 RX ADMIN — Medication 600 MILLIGRAM(S): at 06:03

## 2024-11-24 RX ADMIN — Medication 600 MILLIGRAM(S): at 20:13

## 2024-11-24 RX ADMIN — Medication 5000 UNIT(S): at 17:51

## 2024-11-24 RX ADMIN — Medication 600 MILLIGRAM(S): at 21:13

## 2024-11-24 RX ADMIN — Medication 600 MILLIGRAM(S): at 13:52

## 2024-11-24 RX ADMIN — ACETAMINOPHEN 500MG 975 MILLIGRAM(S): 500 TABLET, COATED ORAL at 02:17

## 2024-11-24 NOTE — PROGRESS NOTE ADULT - ASSESSMENT
A/P: 43yo POD #2 s/p  hysterectomy. Pt is s/p 3u PRBC intra-operatively.  Patient is stable and doing well. No events overnight.      #Postpartum recovery from  hysterectomy  - Continue regular diet.  - Increase ambulation.  - PO pain medication with Tylenol, Motrin and Oxycodone PRN for pain control.    - F/u AM CBC   - Voiding spontaneously   - DVT prophylaxis with Heparin 5000u BID    Ren Marx, PGY2

## 2024-11-24 NOTE — PROGRESS NOTE ADULT - SUBJECTIVE AND OBJECTIVE BOX
R2 POD#2  C-Hyst Progress Note    45yo now POD #2 s/p -hysterectomy (EBL: 2815 s/p 3upRBC) for placenta accreta spectrum. Her pain is well controlled. She is tolerating a regular diet and has passed flatus. Voiding spontaneously. Denies N/V. Denies CP/SOB/lightheadedness/dizziness/headache/epigastric pain/changes in vision.      Vital Signs Last 24 Hours  T(C): 36.9 (24 @ 00:43), Max: 37.4 (24 @ 12:29)  HR: 69 (24 @ 00:43) (62 - 74)  BP: 100/64 (24 @ 00:43) (92/63 - 100/64)  RR: 18 (24 @ 00:43) (18 - 18)  SpO2: 96% (24 @ 00:43) (94% - 96%)    I&O's Summary    2024 07:  -  2024 07:00  --------------------------------------------------------  IN: 1240 mL / OUT: 1560 mL / NET: -320 mL    2024 07:01  -  2024 04:01  --------------------------------------------------------  IN: 0 mL / OUT: 2100 mL / NET: -2100 mL        PE:  General: NAD, patient resting comfortably in bed  Abdomen: Mildly distended, appropriately tender. Fundus firm.  Incision: Clean, dry, intact.  : appropriate amount of lochia on pad  Extremities: SCDs in place, no erythema      Labs:    Blood Type: B Positive  Antibody Screen: Negative  RPR: Negative               10.7   12.80 )-----------( 100      (  @ 12:35 )             31.1                11.0   13.60 )-----------( 110      (  @ 07:09 )             32.2                13.3   17.84 )-----------( 116      (  @ 17:06 )             38.0         MEDICATIONS  (STANDING):  acetaminophen     Tablet .. 975 milliGRAM(s) Oral <User Schedule>  diphtheria/tetanus/pertussis (acellular) Vaccine (Adacel) 0.5 milliLiter(s) IntraMuscular once  heparin   Injectable 5000 Unit(s) SubCutaneous every 12 hours  ibuprofen  Tablet. 600 milliGRAM(s) Oral every 6 hours  lactated ringers. 1000 milliLiter(s) (125 mL/Hr) IV Continuous <Continuous>    MEDICATIONS  (PRN):  diphenhydrAMINE 25 milliGRAM(s) Oral every 6 hours PRN Pruritus  lanolin Ointment 1 Application(s) Topical every 6 hours PRN Sore Nipples  magnesium hydroxide Suspension 30 milliLiter(s) Oral two times a day PRN Constipation  oxyCODONE    IR 5 milliGRAM(s) Oral every 3 hours PRN Moderate to Severe Pain (4-10)  oxyCODONE    IR 5 milliGRAM(s) Oral once PRN Moderate to Severe Pain (4-10)  simethicone 80 milliGRAM(s) Chew every 4 hours PRN Gas

## 2024-11-25 ENCOUNTER — TRANSCRIPTION ENCOUNTER (OUTPATIENT)
Age: 44
End: 2024-11-25

## 2024-11-25 VITALS — SYSTOLIC BLOOD PRESSURE: 105 MMHG | DIASTOLIC BLOOD PRESSURE: 69 MMHG

## 2024-11-25 PROCEDURE — 82330 ASSAY OF CALCIUM: CPT

## 2024-11-25 PROCEDURE — 88302 TISSUE EXAM BY PATHOLOGIST: CPT

## 2024-11-25 PROCEDURE — 88307 TISSUE EXAM BY PATHOLOGIST: CPT

## 2024-11-25 PROCEDURE — 59050 FETAL MONITOR W/REPORT: CPT

## 2024-11-25 PROCEDURE — 81003 URINALYSIS AUTO W/O SCOPE: CPT

## 2024-11-25 PROCEDURE — 87077 CULTURE AEROBIC IDENTIFY: CPT

## 2024-11-25 PROCEDURE — 36415 COLL VENOUS BLD VENIPUNCTURE: CPT

## 2024-11-25 PROCEDURE — 82803 BLOOD GASES ANY COMBINATION: CPT

## 2024-11-25 PROCEDURE — C9399: CPT

## 2024-11-25 PROCEDURE — 85610 PROTHROMBIN TIME: CPT

## 2024-11-25 PROCEDURE — C1769: CPT

## 2024-11-25 PROCEDURE — 86923 COMPATIBILITY TEST ELECTRIC: CPT

## 2024-11-25 PROCEDURE — 86900 BLOOD TYPING SEROLOGIC ABO: CPT

## 2024-11-25 PROCEDURE — 80048 BASIC METABOLIC PNL TOTAL CA: CPT

## 2024-11-25 PROCEDURE — 85014 HEMATOCRIT: CPT

## 2024-11-25 PROCEDURE — 82962 GLUCOSE BLOOD TEST: CPT

## 2024-11-25 PROCEDURE — 82435 ASSAY OF BLOOD CHLORIDE: CPT

## 2024-11-25 PROCEDURE — 84295 ASSAY OF SERUM SODIUM: CPT

## 2024-11-25 PROCEDURE — 85027 COMPLETE CBC AUTOMATED: CPT

## 2024-11-25 PROCEDURE — P9016: CPT

## 2024-11-25 PROCEDURE — 84132 ASSAY OF SERUM POTASSIUM: CPT

## 2024-11-25 PROCEDURE — 86901 BLOOD TYPING SEROLOGIC RH(D): CPT

## 2024-11-25 PROCEDURE — 86780 TREPONEMA PALLIDUM: CPT

## 2024-11-25 PROCEDURE — C1729: CPT

## 2024-11-25 PROCEDURE — 59025 FETAL NON-STRESS TEST: CPT

## 2024-11-25 PROCEDURE — 85025 COMPLETE CBC W/AUTO DIFF WBC: CPT

## 2024-11-25 PROCEDURE — 83605 ASSAY OF LACTIC ACID: CPT

## 2024-11-25 PROCEDURE — 85018 HEMOGLOBIN: CPT

## 2024-11-25 PROCEDURE — 87086 URINE CULTURE/COLONY COUNT: CPT

## 2024-11-25 PROCEDURE — 82947 ASSAY GLUCOSE BLOOD QUANT: CPT

## 2024-11-25 PROCEDURE — 87186 SC STD MICRODIL/AGAR DIL: CPT

## 2024-11-25 PROCEDURE — 86850 RBC ANTIBODY SCREEN: CPT

## 2024-11-25 PROCEDURE — 85730 THROMBOPLASTIN TIME PARTIAL: CPT

## 2024-11-25 RX ORDER — PRENATAL VIT/IRON FUM/FOLIC AC 60 MG-1 MG
0 TABLET ORAL
Refills: 0 | DISCHARGE

## 2024-11-25 RX ORDER — IBUPROFEN 200 MG
3 TABLET ORAL
Qty: 0 | Refills: 0 | DISCHARGE
Start: 2024-11-25

## 2024-11-25 RX ORDER — OXYCODONE HYDROCHLORIDE 30 MG/1
1 TABLET ORAL
Qty: 16 | Refills: 0
Start: 2024-11-25

## 2024-11-25 RX ORDER — ACETAMINOPHEN 500MG 500 MG/1
2 TABLET, COATED ORAL
Qty: 0 | Refills: 0 | DISCHARGE
Start: 2024-11-25

## 2024-11-25 RX ADMIN — Medication 5000 UNIT(S): at 05:09

## 2024-11-25 RX ADMIN — ACETAMINOPHEN 500MG 975 MILLIGRAM(S): 500 TABLET, COATED ORAL at 00:29

## 2024-11-25 RX ADMIN — ACETAMINOPHEN 500MG 975 MILLIGRAM(S): 500 TABLET, COATED ORAL at 06:00

## 2024-11-25 RX ADMIN — ACETAMINOPHEN 500MG 975 MILLIGRAM(S): 500 TABLET, COATED ORAL at 12:05

## 2024-11-25 RX ADMIN — ACETAMINOPHEN 500MG 975 MILLIGRAM(S): 500 TABLET, COATED ORAL at 01:29

## 2024-11-25 RX ADMIN — ACETAMINOPHEN 500MG 975 MILLIGRAM(S): 500 TABLET, COATED ORAL at 05:08

## 2024-11-25 RX ADMIN — ACETAMINOPHEN 500MG 975 MILLIGRAM(S): 500 TABLET, COATED ORAL at 12:42

## 2024-11-25 NOTE — PROGRESS NOTE ADULT - SUBJECTIVE AND OBJECTIVE BOX
OB Postpartum Note:  Delivery    S: 43yo now POD #3 s/p -hysterectomy (EBL: 2815 s/p 3u pRBC) for placenta accreta spectrum. Her pain is well controlled. She is tolerating a regular diet and passing flatus. Voiding spontaneously and ambulating without difficulty. Denies Nausea/vomiting/CP/SOB/lightheadedness/dizziness/headache/epigastric pain/changes in vision.    O:   Vitals:  Vital Signs Last 24 Hrs  T(C): 36.8 (2024 00:24), Max: 37.2 (2024 08:58)  T(F): 98.2 (2024 00:24), Max: 99 (2024 08:58)  HR: 68 (2024 00:24) (68 - 83)  BP: 101/67 (2024 00:24) (97/61 - 104/67)  BP(mean): --  RR: 18 (2024 00:24) (18 - 18)  SpO2: 97% (2024 00:24) (95% - 99%)    Parameters below as of 2024 00:24  Patient On (Oxygen Delivery Method): room air        MEDICATIONS  (STANDING):  acetaminophen     Tablet .. 975 milliGRAM(s) Oral <User Schedule>  diphtheria/tetanus/pertussis (acellular) Vaccine (Adacel) 0.5 milliLiter(s) IntraMuscular once  heparin   Injectable 5000 Unit(s) SubCutaneous every 12 hours  ibuprofen  Tablet. 600 milliGRAM(s) Oral every 6 hours  lactated ringers. 1000 milliLiter(s) (125 mL/Hr) IV Continuous <Continuous>    MEDICATIONS  (PRN):  diphenhydrAMINE 25 milliGRAM(s) Oral every 6 hours PRN Pruritus  lanolin Ointment 1 Application(s) Topical every 6 hours PRN Sore Nipples  magnesium hydroxide Suspension 30 milliLiter(s) Oral two times a day PRN Constipation  oxyCODONE    IR 5 milliGRAM(s) Oral every 3 hours PRN Moderate to Severe Pain (4-10)  oxyCODONE    IR 5 milliGRAM(s) Oral once PRN Moderate to Severe Pain (4-10)  simethicone 80 milliGRAM(s) Chew every 4 hours PRN Gas      Labs:  Blood type: B Positive  Rubella IgG: RPR: Negative                          11.5   10.67[H] >-----------< 139[L]    (  @ 11:05 )             33.6[L]                        12.0   10.71[H] >-----------< 139[L]    (  @ 06:25 )             35.1                        10.7[L]   12.80[H] >-----------< 100[L]    (  @ 12:35 )             31.1[L]                        11.0[L]   13.60[H] >-----------< 110[L]    (  @ 07:09 )             32.2[L]                        13.3   17.84[H] >-----------< 116[L]    (  @ 17:06 )             38.0    24 @ 17:06      136  |  108  |  5[L]  ----------------------------<  100[H]  4.0   |  18[L]  |  0.34[L]        Ca    7.1[L]      2024 17:06            PE:  General: NAD, patient resting comfortably in bed  Abdomen: Mildly distended, appropriately tender. Fundus firm.  Incision: Clean, dry, intact.  : appropriate amount of lochia on pad  Extremities: SCDs in place, no erythema OB Postpartum Note:  Delivery    S: 45yo now POD #3 s/p -hysterectomy (EBL: 2815 s/p 3u pRBC) for placenta accreta spectrum. Her pain is well controlled. She is tolerating a regular diet and passing flatus. Voiding spontaneously and ambulating without difficulty. Denies Nausea/vomiting/CP/SOB/lightheadedness/dizziness/headache/epigastric pain/changes in vision.    O:   Vitals:  Vital Signs Last 24 Hrs  T(C): 36.8 (2024 00:24), Max: 37.2 (2024 08:58)  T(F): 98.2 (2024 00:24), Max: 99 (2024 08:58)  HR: 68 (2024 00:24) (68 - 83)  BP: 101/67 (2024 00:24) (97/61 - 104/67)  BP(mean): --  RR: 18 (2024 00:24) (18 - 18)  SpO2: 97% (2024 00:24) (95% - 99%)    Parameters below as of 2024 00:24  Patient On (Oxygen Delivery Method): room air        MEDICATIONS  (STANDING):  acetaminophen     Tablet .. 975 milliGRAM(s) Oral <User Schedule>  diphtheria/tetanus/pertussis (acellular) Vaccine (Adacel) 0.5 milliLiter(s) IntraMuscular once  heparin   Injectable 5000 Unit(s) SubCutaneous every 12 hours  ibuprofen  Tablet. 600 milliGRAM(s) Oral every 6 hours  lactated ringers. 1000 milliLiter(s) (125 mL/Hr) IV Continuous <Continuous>    MEDICATIONS  (PRN):  diphenhydrAMINE 25 milliGRAM(s) Oral every 6 hours PRN Pruritus  lanolin Ointment 1 Application(s) Topical every 6 hours PRN Sore Nipples  magnesium hydroxide Suspension 30 milliLiter(s) Oral two times a day PRN Constipation  oxyCODONE    IR 5 milliGRAM(s) Oral every 3 hours PRN Moderate to Severe Pain (4-10)  oxyCODONE    IR 5 milliGRAM(s) Oral once PRN Moderate to Severe Pain (4-10)  simethicone 80 milliGRAM(s) Chew every 4 hours PRN Gas      Labs:  Blood type: B Positive  Rubella IgG: RPR: Negative                          11.5   10.67[H] >-----------< 139[L]    (  @ 11:05 )             33.6[L]                        12.0   10.71[H] >-----------< 139[L]    (  @ 06:25 )             35.1                        10.7[L]   12.80[H] >-----------< 100[L]    (  @ 12:35 )             31.1[L]                        11.0[L]   13.60[H] >-----------< 110[L]    (  @ 07:09 )             32.2[L]                        13.3   17.84[H] >-----------< 116[L]    (  @ 17:06 )             38.0    24 @ 17:06      136  |  108  |  5[L]  ----------------------------<  100[H]  4.0   |  18[L]  |  0.34[L]        Ca    7.1[L]      2024 17:06            PE:  General: NAD, patient resting comfortably in bed  Abdomen: Mildly distended, appropriately tender. Fundus surgically absent.  Incision: Clean, dry, intact.  : appropriate amount of lochia on pad  Extremities: SCDs in place, no erythema

## 2024-11-25 NOTE — DISCHARGE NOTE OB - PATIENT PORTAL LINK FT
You can access the FollowMyHealth Patient Portal offered by [facility] by registering at the following website: [email or web address]. By joining [employer]’s FollowMyHealth portal, you will also be able to view your health information using other applications (apps) compatible with our system.

## 2024-11-25 NOTE — DISCHARGE NOTE OB - MEDICATION SUMMARY - MEDICATIONS TO TAKE
I will START or STAY ON the medications listed below when I get home from the hospital:    ibuprofen 200 mg oral tablet  -- 3 tab(s) by mouth every 6 hours  -- Indication: For Placenta accreta    Tylenol Extra Strength 500 mg oral tablet  -- 2 tab(s) by mouth every 6 hours  -- Indication: For Placenta accreta    oxyCODONE 5 mg oral tablet  -- 1 tab(s) by mouth every 6 hours MDD: 4 tabs  -- Indication: For Placenta accreta

## 2024-11-25 NOTE — DISCHARGE NOTE OB - PLAN OF CARE
Pt had  hysterectomy for placenta accreta. Received 3u PRBCs intraop for acute blood loss anemia with stable CBC postop. Had uncomplicated postpartum course and stable for dc home

## 2024-11-25 NOTE — PROGRESS NOTE ADULT - ASSESSMENT
A/P: 43yo POD #2 s/p  hysterectomy. Pt is s/p 3u PRBC intra-operatively. Patient is stable and doing well. No events overnight.      #Postpartum recovery from  hysterectomy  - Continue regular diet.  - Increase ambulation.  - PO pain medication with Tylenol, Motrin and Oxycodone PRN for pain control.    - Voiding spontaneously   - DVT prophylaxis with Heparin 5000u BID    Sandra Nuñez PGY3

## 2024-11-25 NOTE — DISCHARGE NOTE OB - CARE PLAN
1 Principal Discharge DX:	Delivery by  hysterectomy  Assessment and plan of treatment:	Pt had  hysterectomy for placenta accreta. Received 3u PRBCs intraop for acute blood loss anemia with stable CBC postop. Had uncomplicated postpartum course and stable for dc home

## 2024-11-25 NOTE — DISCHARGE NOTE OB - CARE PROVIDER_API CALL
Dawn Del Valle  Obstetrics and Gynecology  [address], [address]  [place] [address]  Phone: [phone]  Fax: [phone]  Follow Up Time:

## 2024-11-25 NOTE — CHART NOTE - NSCHARTNOTEFT_GEN_A_CORE
Urine culture sent preoperatively per Urology request.  Culture reviewed, +10-49K E. coli and E. faecalis.  S/p bilateral ureteral catheters removed on POD#0 and puckett catheter removed on POD#1.     Discussed with Urology resident, no indication for treatment if pt asymptomatic and clinically stable.     D/w Dr Ivon Weiss-Pike PGY4

## 2024-11-25 NOTE — PROGRESS NOTE ADULT - ATTENDING COMMENTS
OB attg note    45yo  s/p  hysterectomy/BS at 34+2 now POD3 for placenta accreta. Had ureteral caths placed preop and removed POD0, QBL 2815 s/p 3u PRBCs intraop. Pt has remained stable postop and recovering appropriately. Pain controlled, tolerating PO, no n/v, ambulating, passing flatus. Final ucx with 10-50K Ecoli and Efaecalis, no sx of UTI and per urology no additional abx required. Pt desires dc home today. VSS, midline vertical incision c/d/i with prineo. Abd soft, NTND. Baby on CPAP in the NICU. Pt pumping. Discussed stable for dc home, discussed pain control regimen at home and RTO 2 wk for incision check.     Vital Signs Last 24 Hrs  T(C): 36.4 (2024 09:18), Max: 37 (2024 12:36)  T(F): 97.5 (2024 09:18), Max: 98.6 (2024 12:36)  HR: 85 (2024 09:18) (68 - 85)  BP: 105/69 (2024 10:17) (98/66 - 105/69)  BP(mean): --  RR: 18 (2024 09:18) (18 - 18)  SpO2: 98% (2024 09:18) (95% - 98%)    Parameters below as of 2024 09:18  Patient On (Oxygen Delivery Method): room air                          11.5   10.67 )-----------( 139      ( 2024 11:05 )             33.6     Archie SHEPHERD
Pt seen and examined  Agree with above assessment and plan  AM CBC h/h: 12.0/35.1, platelets 139, appears possibly hemoconcentrated/incorrect draw so will repeat  Otherwise recovering appropriately and meeting milestones      Penelope Stern MD
Pt seen and examined  increase out of bed  regular diet as tolerated  r/p CBC this afternoon to confirm stability  all questions/concerns of pt and her partner addressed to their satisfaction    Alva Alford MD

## 2024-11-25 NOTE — DISCHARGE NOTE OB - HOSPITAL COURSE
Patient had uncomplicated  hysterectomy with QBL 2815 s/p 3u PRBCs intraop. Had ureteral stents placed preop and removed at end of case..  Please see delivery note for details.  During postpartum course patient's vitals were stable, vaginal bleeding appropriate, and pain well controlled.  On day of discharge patient was ambulating, her pain controlled with oral medications, had adequate oral intake, and was voiding freely.  Discharge instructions and precautions were given.  Will return to clinic in 2-3 week for incision check and 6 weeks for postpartum visit.

## 2024-11-29 LAB — SURGICAL PATHOLOGY STUDY: SIGNIFICANT CHANGE UP

## 2024-12-13 ENCOUNTER — APPOINTMENT (OUTPATIENT)
Dept: OBGYN | Facility: CLINIC | Age: 44
End: 2024-12-13
Payer: COMMERCIAL

## 2024-12-13 ENCOUNTER — NON-APPOINTMENT (OUTPATIENT)
Age: 44
End: 2024-12-13

## 2024-12-13 VITALS — SYSTOLIC BLOOD PRESSURE: 99 MMHG | WEIGHT: 110 LBS | DIASTOLIC BLOOD PRESSURE: 65 MMHG | BODY MASS INDEX: 20.78 KG/M2

## 2024-12-13 PROCEDURE — 0503F POSTPARTUM CARE VISIT: CPT

## 2024-12-13 NOTE — OB RN TRIAGE NOTE - NSDCTEMPSITE_OBGYN_A_OB
Reason for Disposition  • Second attempt to contact family AND no contact made. Phone number verified.    Protocols used: No Contact or Duplicate Contact Call-Pediatric-OH     oral
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
## 2025-01-02 ENCOUNTER — NON-APPOINTMENT (OUTPATIENT)
Age: 45
End: 2025-01-02

## 2025-01-02 ENCOUNTER — APPOINTMENT (OUTPATIENT)
Dept: OBGYN | Facility: CLINIC | Age: 45
End: 2025-01-02
Payer: COMMERCIAL

## 2025-01-02 VITALS — BODY MASS INDEX: 20.69 KG/M2 | DIASTOLIC BLOOD PRESSURE: 63 MMHG | SYSTOLIC BLOOD PRESSURE: 97 MMHG | WEIGHT: 109.5 LBS

## 2025-01-02 PROCEDURE — 0503F POSTPARTUM CARE VISIT: CPT

## 2025-05-27 ENCOUNTER — RESULT REVIEW (OUTPATIENT)
Age: 45
End: 2025-05-27

## 2025-05-27 ENCOUNTER — APPOINTMENT (OUTPATIENT)
Dept: ULTRASOUND IMAGING | Facility: CLINIC | Age: 45
End: 2025-05-27
Payer: COMMERCIAL

## 2025-05-27 ENCOUNTER — OUTPATIENT (OUTPATIENT)
Dept: OUTPATIENT SERVICES | Facility: HOSPITAL | Age: 45
LOS: 1 days | End: 2025-05-27
Payer: COMMERCIAL

## 2025-05-27 ENCOUNTER — APPOINTMENT (OUTPATIENT)
Dept: MAMMOGRAPHY | Facility: CLINIC | Age: 45
End: 2025-05-27
Payer: COMMERCIAL

## 2025-05-27 DIAGNOSIS — Z87.2 PERSONAL HISTORY OF DISEASES OF THE SKIN AND SUBCUTANEOUS TISSUE: Chronic | ICD-10-CM

## 2025-05-27 DIAGNOSIS — Z98.891 HISTORY OF UTERINE SCAR FROM PREVIOUS SURGERY: Chronic | ICD-10-CM

## 2025-05-27 DIAGNOSIS — Z00.00 ENCOUNTER FOR GENERAL ADULT MEDICAL EXAMINATION WITHOUT ABNORMAL FINDINGS: ICD-10-CM

## 2025-05-27 DIAGNOSIS — Z00.8 ENCOUNTER FOR OTHER GENERAL EXAMINATION: ICD-10-CM

## 2025-05-27 PROCEDURE — 77063 BREAST TOMOSYNTHESIS BI: CPT

## 2025-05-27 PROCEDURE — 76641 ULTRASOUND BREAST COMPLETE: CPT | Mod: 26,50

## 2025-05-27 PROCEDURE — 77067 SCR MAMMO BI INCL CAD: CPT | Mod: 26

## 2025-05-27 PROCEDURE — 77067 SCR MAMMO BI INCL CAD: CPT

## 2025-05-27 PROCEDURE — 77063 BREAST TOMOSYNTHESIS BI: CPT | Mod: 26

## 2025-05-27 PROCEDURE — 76641 ULTRASOUND BREAST COMPLETE: CPT

## 2025-07-16 NOTE — OB RN DELIVERY SUMMARY - NS_PROPHYLABX_OBGYN_ALL_OB
Medical screening examination initiated.  I have conducted a focused provider triage encounter, findings are as follows:    Brief history of present illness:  S/p vaginal prolapse surgery, catheter possibly fell out. Not draining into bag anymore    There were no vitals filed for this visit.    Pertinent physical exam:  Well appearing 80 yo female, no distress, appropriately conversational     Brief workup plan:  evaluate for catheter malfunction     Preliminary workup initiated; this workup will be continued and followed by the physician or advanced practice provider that is assigned to the patient when roomed.  
Yes

## (undated) DEVICE — SUT CHROMIC 2-0 36" CT-1

## (undated) DEVICE — GLV 8 PROTEXIS (WHITE)

## (undated) DEVICE — Device

## (undated) DEVICE — SUT QUILL MONODERM 3-0 30CM PS-2

## (undated) DEVICE — GLV 7.5 PROTEXIS (WHITE)

## (undated) DEVICE — SUT POLYSORB 1 36" GS-21

## (undated) DEVICE — DRSG STERISTRIPS 0.5 X 4"

## (undated) DEVICE — FOLEY TRAY 16FR LF URINE METER SURESTEP

## (undated) DEVICE — POSITIONER FOAM EGG CRATE ULNAR 2PCS (PINK)

## (undated) DEVICE — SUT POLYSORB 1 36" GS-21 UNDYED

## (undated) DEVICE — ABDOMINAL BINDER XL 9" X 62"-74"

## (undated) DEVICE — GOWN TRIMAX LG

## (undated) DEVICE — BLADE SCALPEL SAFETYLOCK #10

## (undated) DEVICE — SUT POLYSORB 0 30" GS-21 UNDYED

## (undated) DEVICE — PACK MAJOR ABDOMINAL SUPINE

## (undated) DEVICE — SUT CHROMIC 2-0 30" V-20

## (undated) DEVICE — VISITEC 4X4

## (undated) DEVICE — LIGASURE IMPACT

## (undated) DEVICE — STAPLER SKIN VISI-STAT 35 WIDE

## (undated) DEVICE — WARMING BLANKET LOWER ADULT

## (undated) DEVICE — SUT POLYSORB 0 18" MP UNDYED

## (undated) DEVICE — GLV 8.5 PROTEXIS (WHITE)

## (undated) DEVICE — SUT QUILL MONODERM 3-0 30CM 26MM

## (undated) DEVICE — SPECIMEN CONTAINER 100ML

## (undated) DEVICE — DRAPE MAYO STAND 30"

## (undated) DEVICE — SUT POLYSORB 1 18" UNDYED

## (undated) DEVICE — VENODYNE/SCD SLEEVE CALF LARGE

## (undated) DEVICE — MARKING PEN W RULER

## (undated) DEVICE — WARMING BLANKET UPPER ADULT

## (undated) DEVICE — DRAPE INSTRUMENT POUCH 6.75" X 11"

## (undated) DEVICE — SUT BIOSYN 1 36" GS-25

## (undated) DEVICE — DRAPE TOWEL BLUE 17" X 24"

## (undated) DEVICE — GLV 6.5 PROTEXIS (WHITE)

## (undated) DEVICE — VENODYNE/SCD SLEEVE CALF MEDIUM

## (undated) DEVICE — PREP BETADINE KIT

## (undated) DEVICE — SOL IRR POUR NS 0.9% 500ML

## (undated) DEVICE — DRSG OPSITE 13.75 X 4"

## (undated) DEVICE — GLV 7 PROTEXIS (WHITE)

## (undated) DEVICE — BLADE SCALPEL SAFETYLOCK #15

## (undated) DEVICE — PREP CHLORAPREP HI-LITE ORANGE 26ML

## (undated) DEVICE — LAP PAD 18 X 18"

## (undated) DEVICE — MEDICATION LABELS W MARKER

## (undated) DEVICE — SOL IRR POUR H2O 250ML

## (undated) DEVICE — DRAPE LIGHT HANDLE COVER (BLUE)